# Patient Record
Sex: FEMALE | Race: WHITE | NOT HISPANIC OR LATINO | Employment: FULL TIME | URBAN - METROPOLITAN AREA
[De-identification: names, ages, dates, MRNs, and addresses within clinical notes are randomized per-mention and may not be internally consistent; named-entity substitution may affect disease eponyms.]

---

## 2020-06-29 ENCOUNTER — CLINICAL SUPPORT (OUTPATIENT)
Dept: URGENT CARE | Facility: CLINIC | Age: 40
End: 2020-06-29
Payer: COMMERCIAL

## 2020-06-29 VITALS — TEMPERATURE: 96 F

## 2020-06-29 PROCEDURE — U0003 INFECTIOUS AGENT DETECTION BY NUCLEIC ACID (DNA OR RNA); SEVERE ACUTE RESPIRATORY SYNDROME CORONAVIRUS 2 (SARS-COV-2) (CORONAVIRUS DISEASE [COVID-19]), AMPLIFIED PROBE TECHNIQUE, MAKING USE OF HIGH THROUGHPUT TECHNOLOGIES AS DESCRIBED BY CMS-2020-01-R: HCPCS

## 2020-06-29 PROCEDURE — 99211 OFF/OP EST MAY X REQ PHY/QHP: CPT | Mod: S$GLB,,, | Performed by: NURSE PRACTITIONER

## 2020-06-29 PROCEDURE — 99211 PR OFFICE/OUTPT VISIT, EST, LEVL I: ICD-10-PCS | Mod: S$GLB,,, | Performed by: NURSE PRACTITIONER

## 2020-07-04 LAB — SARS-COV-2 RNA RESP QL NAA+PROBE: NOT DETECTED

## 2021-03-09 ENCOUNTER — PATIENT MESSAGE (OUTPATIENT)
Dept: RESEARCH | Facility: HOSPITAL | Age: 41
End: 2021-03-09

## 2021-03-18 ENCOUNTER — PATIENT MESSAGE (OUTPATIENT)
Dept: RESEARCH | Facility: HOSPITAL | Age: 41
End: 2021-03-18

## 2021-03-26 ENCOUNTER — PATIENT MESSAGE (OUTPATIENT)
Dept: RESEARCH | Facility: HOSPITAL | Age: 41
End: 2021-03-26

## 2022-09-06 ENCOUNTER — OFFICE VISIT (OUTPATIENT)
Dept: INTERNAL MEDICINE | Facility: CLINIC | Age: 42
End: 2022-09-06
Payer: COMMERCIAL

## 2022-09-06 VITALS
SYSTOLIC BLOOD PRESSURE: 113 MMHG | OXYGEN SATURATION: 98 % | HEART RATE: 69 BPM | WEIGHT: 120.5 LBS | DIASTOLIC BLOOD PRESSURE: 69 MMHG | BODY MASS INDEX: 20.68 KG/M2

## 2022-09-06 DIAGNOSIS — G43.009 MIGRAINE WITHOUT AURA AND WITHOUT STATUS MIGRAINOSUS, NOT INTRACTABLE: Primary | ICD-10-CM

## 2022-09-06 DIAGNOSIS — Z13.1 SCREENING FOR DIABETES MELLITUS: ICD-10-CM

## 2022-09-06 DIAGNOSIS — Z13.6 SCREENING FOR CARDIOVASCULAR CONDITION: ICD-10-CM

## 2022-09-06 DIAGNOSIS — N94.3 PREMENSTRUAL SYNDROME: ICD-10-CM

## 2022-09-06 DIAGNOSIS — Q21.12 PFO (PATENT FORAMEN OVALE): ICD-10-CM

## 2022-09-06 DIAGNOSIS — Z12.31 SCREENING MAMMOGRAM FOR BREAST CANCER: ICD-10-CM

## 2022-09-06 DIAGNOSIS — Z00.00 HEALTH MAINTENANCE EXAMINATION: ICD-10-CM

## 2022-09-06 PROCEDURE — 1159F PR MEDICATION LIST DOCUMENTED IN MEDICAL RECORD: ICD-10-PCS | Mod: CPTII,S$GLB,, | Performed by: STUDENT IN AN ORGANIZED HEALTH CARE EDUCATION/TRAINING PROGRAM

## 2022-09-06 PROCEDURE — 99999 PR PBB SHADOW E&M-EST. PATIENT-LVL III: ICD-10-PCS | Mod: PBBFAC,,, | Performed by: STUDENT IN AN ORGANIZED HEALTH CARE EDUCATION/TRAINING PROGRAM

## 2022-09-06 PROCEDURE — 1159F MED LIST DOCD IN RCRD: CPT | Mod: CPTII,S$GLB,, | Performed by: STUDENT IN AN ORGANIZED HEALTH CARE EDUCATION/TRAINING PROGRAM

## 2022-09-06 PROCEDURE — 1160F PR REVIEW ALL MEDS BY PRESCRIBER/CLIN PHARMACIST DOCUMENTED: ICD-10-PCS | Mod: CPTII,S$GLB,, | Performed by: STUDENT IN AN ORGANIZED HEALTH CARE EDUCATION/TRAINING PROGRAM

## 2022-09-06 PROCEDURE — 99386 PR PREVENTIVE VISIT,NEW,40-64: ICD-10-PCS | Mod: S$GLB,,, | Performed by: STUDENT IN AN ORGANIZED HEALTH CARE EDUCATION/TRAINING PROGRAM

## 2022-09-06 PROCEDURE — 3008F PR BODY MASS INDEX (BMI) DOCUMENTED: ICD-10-PCS | Mod: CPTII,S$GLB,, | Performed by: STUDENT IN AN ORGANIZED HEALTH CARE EDUCATION/TRAINING PROGRAM

## 2022-09-06 PROCEDURE — 1160F RVW MEDS BY RX/DR IN RCRD: CPT | Mod: CPTII,S$GLB,, | Performed by: STUDENT IN AN ORGANIZED HEALTH CARE EDUCATION/TRAINING PROGRAM

## 2022-09-06 PROCEDURE — 3008F BODY MASS INDEX DOCD: CPT | Mod: CPTII,S$GLB,, | Performed by: STUDENT IN AN ORGANIZED HEALTH CARE EDUCATION/TRAINING PROGRAM

## 2022-09-06 PROCEDURE — 3078F DIAST BP <80 MM HG: CPT | Mod: CPTII,S$GLB,, | Performed by: STUDENT IN AN ORGANIZED HEALTH CARE EDUCATION/TRAINING PROGRAM

## 2022-09-06 PROCEDURE — 99999 PR PBB SHADOW E&M-EST. PATIENT-LVL III: CPT | Mod: PBBFAC,,, | Performed by: STUDENT IN AN ORGANIZED HEALTH CARE EDUCATION/TRAINING PROGRAM

## 2022-09-06 PROCEDURE — 3074F PR MOST RECENT SYSTOLIC BLOOD PRESSURE < 130 MM HG: ICD-10-PCS | Mod: CPTII,S$GLB,, | Performed by: STUDENT IN AN ORGANIZED HEALTH CARE EDUCATION/TRAINING PROGRAM

## 2022-09-06 PROCEDURE — 3074F SYST BP LT 130 MM HG: CPT | Mod: CPTII,S$GLB,, | Performed by: STUDENT IN AN ORGANIZED HEALTH CARE EDUCATION/TRAINING PROGRAM

## 2022-09-06 PROCEDURE — 3078F PR MOST RECENT DIASTOLIC BLOOD PRESSURE < 80 MM HG: ICD-10-PCS | Mod: CPTII,S$GLB,, | Performed by: STUDENT IN AN ORGANIZED HEALTH CARE EDUCATION/TRAINING PROGRAM

## 2022-09-06 PROCEDURE — 99386 PREV VISIT NEW AGE 40-64: CPT | Mod: S$GLB,,, | Performed by: STUDENT IN AN ORGANIZED HEALTH CARE EDUCATION/TRAINING PROGRAM

## 2022-09-06 RX ORDER — SUMATRIPTAN AND NAPROXEN SODIUM 85; 500 MG/1; MG/1
1 TABLET, FILM COATED ORAL ONCE AS NEEDED
COMMUNITY
End: 2023-05-25 | Stop reason: SDUPTHER

## 2022-09-06 RX ORDER — BACLOFEN 20 MG
1 TABLET ORAL ONCE
COMMUNITY
End: 2024-01-10 | Stop reason: ALTCHOICE

## 2022-09-06 RX ORDER — ESZOPICLONE 2 MG/1
2 TABLET, FILM COATED ORAL NIGHTLY PRN
COMMUNITY
End: 2024-01-10 | Stop reason: ALTCHOICE

## 2022-09-06 NOTE — PATIENT INSTRUCTIONS
Dietary irritants to try avoiding: Coffee, Cola, Beer, Tomatoes, Chocolate, Tea, Citrus fruits    Vitex agnus-castus (Chasteberry) 20 to 40 mg for pre-menstrual symptoms

## 2022-09-06 NOTE — PROGRESS NOTES
Subjective:       Patient ID: Erika Avina is a 42 y.o. female.    Chief Complaint: Health maintenance examination [Z00.00]    Patient is new to me, here to establish care.    Health maintenance -   Denies family history of colorectal cancer.   Has not yet started mammogram screening.   Denies family history of breast cancers.  Denies family history of ovarian cancers.  Last pap performed NOV2020.   Seeing Dr. Andrews for gynecology  Denies history of prior abnormal pap smears.  Significant family history of cardiac disease.  UTD on COVID19 primary and booster, Tdap vaccinations.  Never smoker.  Drinks alcohol 3-4 times weekly, 1-2 drinks per sitting.  Completed HIV and Hep C screening.  Due for lipid screening.  Due for diabetes screening.  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  Endorses exercising routinely  Plays soccer once weekly  Bike rides once weekly  Runs a few times weekly  Endorses overall healthful diet   Plenty of fresh vegetables, whole grains  Chicken, salmon, red meat for proteins    Has regular menstrual cycles once monthly.  Menstruation lasts for 4-5 days.  Dysmenorrhea, insomnia around menstrual cycles.   Takes Lunesta PRN insomnia  Denies menorrhagia or requiring more than 5 pads or tampons in a single day.  Began menarche at age 17.   2 total pregnancies. All born full term, vaginal.  No gestational HTN and DM.  Currently sexually active with male partner, .    with vasectomy.    Migraines -   Currently on Amovig injections for maintenance with good control  Sees neurologist routinely, Dr. Carr   Taking magnesium 500 mg  Has sumatriptan-naproxen PRN abortive treatment    Takes ASA due to PFO for flights and prolonged immobilization    Review of Systems   Constitutional:  Negative for appetite change, chills, fatigue, fever and unexpected weight change.   Respiratory:  Negative for cough and shortness of breath.    Cardiovascular:  Negative for chest pain,  palpitations and leg swelling.   Gastrointestinal:  Negative for abdominal pain, constipation, diarrhea, nausea and vomiting.   Skin:  Negative for rash.   Neurological:  Positive for headaches. Negative for dizziness, syncope and weakness.       Current Outpatient Medications   Medication Instructions    aspirin (ECOTRIN) 81 mg, Oral, Daily    erenumab-aooe (AIMOVIG) 140 mg, Subcutaneous, Every 28 days    eszopiclone (LUNESTA) 2 mg, Oral, Nightly PRN    magnesium oxide 500 mg Tab 1 capsule, Oral, Once    SUMAtriptan-naproxen (TREXIMET)  mg Tab 1 tablet, Oral, Once as needed     Objective:      Vitals:    09/06/22 1504   BP: 113/69   Pulse: 69   SpO2: 98%   Weight: 54.6 kg (120 lb 7.7 oz)   PainSc: 0-No pain     Body mass index is 20.68 kg/m².    Physical Exam  Vitals reviewed.   Constitutional:       General: She is not in acute distress.     Appearance: Normal appearance. She is not ill-appearing or diaphoretic.   HENT:      Head: Normocephalic and atraumatic.      Right Ear: Tympanic membrane, ear canal and external ear normal. There is no impacted cerumen.      Left Ear: Tympanic membrane, ear canal and external ear normal. There is no impacted cerumen.      Nose: Nose normal. No rhinorrhea.      Mouth/Throat:      Mouth: Mucous membranes are moist.      Pharynx: Oropharynx is clear. No oropharyngeal exudate or posterior oropharyngeal erythema.   Eyes:      General: No scleral icterus.        Right eye: No discharge.         Left eye: No discharge.      Conjunctiva/sclera: Conjunctivae normal.   Neck:      Thyroid: No thyromegaly or thyroid tenderness.      Trachea: Trachea normal.   Cardiovascular:      Rate and Rhythm: Normal rate and regular rhythm.      Heart sounds: Normal heart sounds. No murmur heard.    No friction rub. No gallop.   Pulmonary:      Effort: Pulmonary effort is normal. No respiratory distress.      Breath sounds: Normal breath sounds. No stridor. No wheezing, rhonchi or rales.    Abdominal:      General: Bowel sounds are normal. There is no distension.      Palpations: Abdomen is soft.      Tenderness: There is no abdominal tenderness. There is no guarding or rebound.   Musculoskeletal:         General: No swelling or deformity.      Cervical back: Neck supple.   Lymphadenopathy:      Head:      Right side of head: No submandibular or posterior auricular adenopathy.      Left side of head: No submandibular or posterior auricular adenopathy.      Cervical: No cervical adenopathy.      Right cervical: No superficial, deep or posterior cervical adenopathy.     Left cervical: No superficial, deep or posterior cervical adenopathy.      Upper Body:      Right upper body: No supraclavicular adenopathy.      Left upper body: No supraclavicular adenopathy.   Skin:     General: Skin is warm and dry.   Neurological:      General: No focal deficit present.      Mental Status: She is alert. Mental status is at baseline.      Gait: Gait normal.   Psychiatric:         Mood and Affect: Mood normal.         Behavior: Behavior normal.       Assessment:       1. Health maintenance examination    2. Migraine without aura and without status migrainosus, not intractable    3. Premenstrual syndrome    4. PFO (patent foramen ovale)    5. Screening for cardiovascular condition    6. Screening for diabetes mellitus    7. Screening mammogram for breast cancer        Plan:       Migraine without aura and without status migrainosus, not intractable  Continue medication, evaluation, and management per neurology.  -     Magnesium; Future  -     CBC Auto Differential; Future  -     Comprehensive Metabolic Panel; Future  -     TSH; Future    Premenstrual syndrome  Recommend trial of Agnus vitex     PFO (patent foramen ovale)  Continue ASA as directed     Health maintenance examination  RTC in 1 year for annual appointment, sooner PRN.    Screening for cardiovascular condition  -     Lipid Panel; Future    Screening for  diabetes mellitus  -     Hemoglobin A1C; Future    Screening mammogram for breast cancer  -     Mammo Digital Screening Bilat w/ Dashawn; Future      Blanka Moore MD  9/6/2022

## 2022-10-10 ENCOUNTER — PATIENT MESSAGE (OUTPATIENT)
Dept: INTERNAL MEDICINE | Facility: CLINIC | Age: 42
End: 2022-10-10
Payer: COMMERCIAL

## 2022-10-25 ENCOUNTER — PATIENT OUTREACH (OUTPATIENT)
Dept: ADMINISTRATIVE | Facility: HOSPITAL | Age: 42
End: 2022-10-25
Payer: COMMERCIAL

## 2023-01-10 ENCOUNTER — PATIENT MESSAGE (OUTPATIENT)
Dept: INTERNAL MEDICINE | Facility: CLINIC | Age: 43
End: 2023-01-10
Payer: COMMERCIAL

## 2023-01-10 DIAGNOSIS — G43.009 MIGRAINE WITHOUT AURA AND WITHOUT STATUS MIGRAINOSUS, NOT INTRACTABLE: Primary | ICD-10-CM

## 2023-01-11 NOTE — TELEPHONE ENCOUNTER
Pt was scheduled  
Referral to neurology placed. Please assist with rescheduling labs, thank you!  
Principal Discharge DX:	URI (upper respiratory infection)

## 2023-01-17 ENCOUNTER — PATIENT MESSAGE (OUTPATIENT)
Dept: INTERNAL MEDICINE | Facility: CLINIC | Age: 43
End: 2023-01-17
Payer: COMMERCIAL

## 2023-01-18 ENCOUNTER — PATIENT MESSAGE (OUTPATIENT)
Dept: ADMINISTRATIVE | Facility: HOSPITAL | Age: 43
End: 2023-01-18
Payer: COMMERCIAL

## 2023-03-17 ENCOUNTER — OFFICE VISIT (OUTPATIENT)
Dept: NEUROLOGY | Facility: CLINIC | Age: 43
End: 2023-03-17
Payer: COMMERCIAL

## 2023-03-17 VITALS
HEART RATE: 70 BPM | BODY MASS INDEX: 19.97 KG/M2 | WEIGHT: 117 LBS | SYSTOLIC BLOOD PRESSURE: 100 MMHG | HEIGHT: 64 IN | DIASTOLIC BLOOD PRESSURE: 63 MMHG

## 2023-03-17 DIAGNOSIS — G43.009 MIGRAINE WITHOUT AURA AND WITHOUT STATUS MIGRAINOSUS, NOT INTRACTABLE: ICD-10-CM

## 2023-03-17 PROCEDURE — 99999 PR PBB SHADOW E&M-EST. PATIENT-LVL III: ICD-10-PCS | Mod: PBBFAC,,, | Performed by: PSYCHIATRY & NEUROLOGY

## 2023-03-17 PROCEDURE — 3078F PR MOST RECENT DIASTOLIC BLOOD PRESSURE < 80 MM HG: ICD-10-PCS | Mod: CPTII,S$GLB,, | Performed by: PSYCHIATRY & NEUROLOGY

## 2023-03-17 PROCEDURE — 1160F RVW MEDS BY RX/DR IN RCRD: CPT | Mod: CPTII,S$GLB,, | Performed by: PSYCHIATRY & NEUROLOGY

## 2023-03-17 PROCEDURE — 1159F MED LIST DOCD IN RCRD: CPT | Mod: CPTII,S$GLB,, | Performed by: PSYCHIATRY & NEUROLOGY

## 2023-03-17 PROCEDURE — 3074F PR MOST RECENT SYSTOLIC BLOOD PRESSURE < 130 MM HG: ICD-10-PCS | Mod: CPTII,S$GLB,, | Performed by: PSYCHIATRY & NEUROLOGY

## 2023-03-17 PROCEDURE — 3008F BODY MASS INDEX DOCD: CPT | Mod: CPTII,S$GLB,, | Performed by: PSYCHIATRY & NEUROLOGY

## 2023-03-17 PROCEDURE — 99204 OFFICE O/P NEW MOD 45 MIN: CPT | Mod: S$GLB,,, | Performed by: PSYCHIATRY & NEUROLOGY

## 2023-03-17 PROCEDURE — 99204 PR OFFICE/OUTPT VISIT, NEW, LEVL IV, 45-59 MIN: ICD-10-PCS | Mod: S$GLB,,, | Performed by: PSYCHIATRY & NEUROLOGY

## 2023-03-17 PROCEDURE — 3078F DIAST BP <80 MM HG: CPT | Mod: CPTII,S$GLB,, | Performed by: PSYCHIATRY & NEUROLOGY

## 2023-03-17 PROCEDURE — 99999 PR PBB SHADOW E&M-EST. PATIENT-LVL III: CPT | Mod: PBBFAC,,, | Performed by: PSYCHIATRY & NEUROLOGY

## 2023-03-17 PROCEDURE — 1159F PR MEDICATION LIST DOCUMENTED IN MEDICAL RECORD: ICD-10-PCS | Mod: CPTII,S$GLB,, | Performed by: PSYCHIATRY & NEUROLOGY

## 2023-03-17 PROCEDURE — 3008F PR BODY MASS INDEX (BMI) DOCUMENTED: ICD-10-PCS | Mod: CPTII,S$GLB,, | Performed by: PSYCHIATRY & NEUROLOGY

## 2023-03-17 PROCEDURE — 1160F PR REVIEW ALL MEDS BY PRESCRIBER/CLIN PHARMACIST DOCUMENTED: ICD-10-PCS | Mod: CPTII,S$GLB,, | Performed by: PSYCHIATRY & NEUROLOGY

## 2023-03-17 PROCEDURE — 3074F SYST BP LT 130 MM HG: CPT | Mod: CPTII,S$GLB,, | Performed by: PSYCHIATRY & NEUROLOGY

## 2023-03-17 RX ORDER — ASCORBIC ACID 125 MG
250 TABLET,CHEWABLE ORAL
COMMUNITY
End: 2024-01-10 | Stop reason: ALTCHOICE

## 2023-03-17 NOTE — PROGRESS NOTES
[facility] - HEADACHE MEDICINE    CHIEF COMPLAINT?   Ms. Erika Avina chief concern is headache.     Name of the referring physician Blanka Moore MD     Name of primary care provider Blanka Moore MD      Subjective:    HPI:  Ms. Erika Avina presents today to discuss their ongoing headaches.   The patient shares that she seeks to establish care for headache after having outside provider for many years.  Overall, she feels as if her current treatment plan has migraines well managed.  She describes a history of grossly uncontrolled headaches that had significant impact on life and function.  The addition of Aimovig several years ago, 2017, essentially changed my life.   She has ineffective acute regimen with sumatriptan/naproxen and denies needing refills at this time.    When prompted regarding Aimovig and side effects, the patient admits to a long history of constipation since starting the medication.  She takes magnesium daily in an effort to compensate for the medication.  She also notes that there is wear off effect every month; majority of headaches happen within the last 7 days of injection cycle.    Headache details:  Age of onset: 3rd grade  When did they become more of a problem: 3rd grade  # of Total headache days per month: 7; 12-15 before aimovig  # of Migraine or severe days per month: 0; 8-10 before aimovig  This rate has been present >3 months: yes  Intensity of average and most severe headaches: 2/10, 10/10  Duration of headache pain: >4 hrs untreated  Quality of pain: Pulsating/Throbbing, sharp  Laterality: bilateral, unilateral not side locked sometimes   Location: temporal, frontal, whole head   Photophobia and phonophobia: yes  Nausea and vomiting: yes  Causes avoidance of physical activity: yes  Aura: no  Autonomic symptoms: no  Family Hx of migraines: grandmother  Birth Control/Hormones: none  Have qualities and features been stable for >2 years:  yes    Preventive Medications failed: topiramate (>3 months, ineffective), amitriptyline (>3 months, ineffective), atenolol (>3 months, ineffective), nebivolol (>3 months, ineffective)    Acute medications failed: treximet (highly effective), eletriptan (ineffective), rizatriptan (ineffective)    OTC Medications: none  Is medication overuse contributing to the patient's current migraine burden: no    Hx of (Bolded items are positive): depression, anxiety, fibromyalgia, autoimmune disorder, head trauma, neurological infection, glaucoma, asthma, nephrolithiasis, MI, Stroke, Raynaud's phen., PFO    Current HA Regimen:  Aimovig 140mg monthly  Treximet prn     LATEST IMAGING:  Reports normal MRI's in the past    PAST MEDICAL HISTORY:  Past Medical History:   Diagnosis Date    Migraine headache        PAST SURGICAL HISTORY:  Past Surgical History:   Procedure Laterality Date    WISDOM TOOTH EXTRACTION         CURRENT MEDS:  Current Outpatient Medications   Medication Sig Dispense Refill    aspirin (ECOTRIN) 81 MG EC tablet Take 1 tablet (81 mg total) by mouth once daily.  0    erenumab-aooe (AIMOVIG) 70 mg/mL autoinjector Inject 140 mg into the skin every 28 days.      eszopiclone (LUNESTA) 2 MG Tab Take 2 mg by mouth nightly as needed. Patient needs refill.      magnesium citrate 125 mg Cap Take 250 mg by mouth.      SUMAtriptan-naproxen (TREXIMET)  mg Tab Take 1 tablet by mouth once as needed.      magnesium oxide 500 mg Tab Take 1 capsule by mouth once.       No current facility-administered medications for this visit.       ALLERGIES:  Review of patient's allergies indicates:  No Known Allergies    FAMILY HISTORY:  Family History   Problem Relation Age of Onset    No Known Problems Mother     Atrial fibrillation Father     No Known Problems Brother     Aortic aneurysm Maternal Grandmother         AAA    Coronary artery disease Maternal Grandmother         CABG    Pulmonary fibrosis Maternal Grandmother          "idiopathic    Heart attack Maternal Grandfather     Skin cancer Maternal Grandfather     Prostate cancer Paternal Grandfather         metastasized intestinal    Colon cancer Neg Hx     Breast cancer Neg Hx     Ovarian cancer Neg Hx     Stroke Neg Hx     Diabetes Neg Hx        SOCIAL HISTORY:  Social History     Tobacco Use    Smoking status: Never    Tobacco comments:      smokes outside   Substance Use Topics    Alcohol use: Yes     Alcohol/week: 5.0 standard drinks     Types: 5 Glasses of wine per week     Comment: social    Drug use: Not Currently       Review of Systems:  Gen: no fever, no chills, no generalized feeling of weakness   HEENT: no double vision, no blurred vision, no eye pain, no eye exudates.    Heart: no chest pain, no SOB    Lungs: no SOB, no cough    MSK: no weakness of legs, intact ROM    ABD: no abd pain, no N/V/D/C, no difficulty with defecation .    Extremities: No leg pain, no edema.    Objective:  PHYSICAL EXAMINATION??   Erika Avina is a 42 y.o. female patient who has the following vital signs with   Vitals:    03/17/23 1339   BP: 100/63   BP Location: Left arm   Patient Position: Sitting   Pulse: 70   Weight: 53.1 kg (117 lb)   Height: 5' 4" (1.626 m)   , body surface area is 1.55 meters squared.     General: female in NAD, alert and awake, Aox3, well groomed. ?    ? ?    HEENT: Head is NC/AT EOMI, pupil size: 4 mm B/L, no nystagmus noted; hearing grossly intact b/l. Mucous membrane moist, uvula midline, no pharyngeal erythema,exudates or discharges.     Neck:  Active range of motion.  no nuchal rigidity.      Cardiovascular: well perfused, no cyanosis        Respiratory: Symmetric chest rise noted       Musculoskeletal: Muscle tone noted to be adequate for patient age, muscle mass is WNL. No spontaneous movements or fasciculations noted during this examination.       Extremities: No pedal edema or calf tenderness.      Neurological Examination.    Mental status: AA&O " x3; Affect/mood is euthymic/congruent; no aphasia noted during examination. Patient answers simple questions appropriately & follows simple commands; no dysarthria or expressive aphasia; no samira-neglect or extinction. Vocabulary/word finding: excellent.       Cranial Nerves: II-XII grossly intact bilaterally.     Muscle Function: Tone WNL and Muscle bulk WNL.  Full and symmetric use of bilateral upper and lower extremities against gravity     Gait: adequate casual gait with stride length and arm swing WNL.  Stable without assistance    REVIEW   We reviewed their current medical history, medications, allergies, past medical history, surgical history, social history, family history, and review of systems, and updated all the information.     ASSESSMENT AND PLAN   Erika? is a very pleasant, 42 y.o. with complicated diagnosis of     1. Migraine without aura and without status migrainosus, not intractable  Ambulatory referral/consult to Neurology        Erika has headaches that meet International Headache Society Criteria for episodic migraine without aura.  Medication overuse is not likely contributing to this patient's headaches.     Patient feels not only severe disabling pain, but is also unable to function due to headaches when they are present.     My approach to every patient is multimodal.   I focused our discussion on addressing modifiable risk factors and trying to decrease them. After discussion with the patient, I recommend the followin. Preventive medications; these medications have to be taken every single day to decrease headache frequency and severity; it takes at least minimum of few weeks to see any results; and have to be taken for at least 6-12 months. The medication should be started at a small dose and increased if there are no significant side effects. Patient compliance is key for effectiveness of the preventive medications. (we discussed the options of beta-blockers, calcium channel  blockers, SSRIs, SNRIs, neuron modulating agents)   Failed:    topiramate (>3 months, ineffective), amitriptyline (>3 months, ineffective), atenolol (>3 months, ineffective), nebivolol (>3 months, ineffective)    CONTINUE: AIMOVIG 140 mg every 28 days  Due to reported constipation, we discussed possibly moving away from this particular injectable when she has used her current supply (3 months).  We will communicate about this via chart message between visits.     2. Acute (abortive) medications- these medications are to be taken only at the onset of severe headache, and please limit the total of any combination of these medications to less than 10 days per month on average because they can cause medication overuse headaches.   Failed:   eletriptan (ineffective), rizatriptan (ineffective)    CONTINUE: TREXIMET prn    Patient was encouraged to continue use less than 10 days per month to avoid medication overuse headaches.    3. Natural approaches to increasing brain energy and serotonin:    SAMe 200 mg a day    Vitamin B2 400 mg daily    Vitamin B12 1000 mcg daily    Getting early morning light to increase natural serotonin, melatonin. Could also consider light therapy box, 10,000 LUX.     4. Headache prevention and acute treatment over-the counter supplements    Boswellia 800 mg 2-3 times per day, example brand Guzman's, natural anti inflammatory herb    Sleep? modulation- melatonin at night 5 to 10 mg 30 minutes prior to sleep    Feverfew Tea 1-3 cups per day. Can get from [employer] or [employer]- A Natural anti inflammatory approach that does not cause further sensitization of the system.    Magnesium Citrate 250 mg daily, slowly increase up to 500-1000 mg daily. Side effect may include diarrhea, so we recommend stopping at whatever dose is comfortable for your body without causing this side effect. This supplement can be very helpful for preventing headache, preventing migraine aura, calming nerves,  muscles and even mood. Recommend starting slowly, as it can also lead to increased bowel movements or diarrhea.?     5. DIET: I recommend a diet that heavily favors fruits and vegetables while reducing carbs. More information is available upon request.     6. EXERCISE: Gentle movement exercises, concentrate on combination of muscle building and aerobic. I also recommend adding gentle Yoga therapy. The goal is 150 minutes per week of moderate to rigorous exercise, but you should start at your own pace and progress slowly and safely as discussed today.    7. ANXIETY/STRESS- Control stressors with yoga, meditation, counseling, or other methods of mindfulness.     8. SLEEP- aim for 7-8 hrs of restful sleep per night.     9. FUN- Increase fun time spend with friends and family     Benefits, side effects, and alternatives were discussed extensively with the patient.?     Erika verbally endorsed understanding of all the recommendations.?     she is going to follow up with our clinic in 1 year or sooner as needed?     I spent a total of 45 minutes on the day of the visit. This includes face to face time and non-face to face time preparing to see the patient (eg, review of tests), obtaining and/or reviewing separately obtained history, documenting clinical information in the electronic or other health record, independently interpreting results and communicating results to the patient/family/caregiver, or care coordinator.    A dictation device was used to produce this documentation which can sometimes result in grammatical errors or the replacement of similar sounding words.    Rad Mcguire, DO  Headache Medicine

## 2023-05-02 ENCOUNTER — PATIENT MESSAGE (OUTPATIENT)
Dept: NEUROLOGY | Facility: CLINIC | Age: 43
End: 2023-05-02
Payer: COMMERCIAL

## 2023-05-02 DIAGNOSIS — G47.00 INSOMNIA, UNSPECIFIED TYPE: ICD-10-CM

## 2023-05-02 DIAGNOSIS — G43.009 MIGRAINE WITHOUT AURA AND WITHOUT STATUS MIGRAINOSUS, NOT INTRACTABLE: Primary | ICD-10-CM

## 2023-05-03 RX ORDER — TRAZODONE HYDROCHLORIDE 50 MG/1
50 TABLET ORAL NIGHTLY
Qty: 30 TABLET | Refills: 2 | Status: SHIPPED | OUTPATIENT
Start: 2023-05-03 | End: 2024-05-02

## 2023-05-25 ENCOUNTER — PATIENT MESSAGE (OUTPATIENT)
Dept: NEUROLOGY | Facility: CLINIC | Age: 43
End: 2023-05-25
Payer: COMMERCIAL

## 2023-05-25 DIAGNOSIS — G43.009 MIGRAINE WITHOUT AURA AND WITHOUT STATUS MIGRAINOSUS, NOT INTRACTABLE: Primary | ICD-10-CM

## 2023-05-25 RX ORDER — SUMATRIPTAN AND NAPROXEN SODIUM 85; 500 MG/1; MG/1
TABLET, FILM COATED ORAL
Qty: 9 TABLET | Refills: 11 | Status: SHIPPED | OUTPATIENT
Start: 2023-05-25

## 2023-05-25 NOTE — TELEPHONE ENCOUNTER
Continued constipation with Aimovig, we will transition to Emgality.    Refill provided for treximet.

## 2023-05-26 ENCOUNTER — TELEPHONE (OUTPATIENT)
Dept: PHARMACY | Facility: CLINIC | Age: 43
End: 2023-05-26
Payer: COMMERCIAL

## 2023-05-31 ENCOUNTER — PATIENT MESSAGE (OUTPATIENT)
Dept: NEUROLOGY | Facility: CLINIC | Age: 43
End: 2023-05-31
Payer: COMMERCIAL

## 2023-08-15 ENCOUNTER — HOSPITAL ENCOUNTER (OUTPATIENT)
Dept: RADIOLOGY | Facility: OTHER | Age: 43
Discharge: HOME | End: 2023-08-15
Attending: STUDENT IN AN ORGANIZED HEALTH CARE EDUCATION/TRAINING PROGRAM
Payer: COMMERCIAL

## 2023-08-15 DIAGNOSIS — Z12.31 SCREENING MAMMOGRAM FOR BREAST CANCER: ICD-10-CM

## 2023-08-15 PROCEDURE — 77063 BREAST TOMOSYNTHESIS BI: CPT | Mod: 26,,, | Performed by: RADIOLOGY

## 2023-08-15 PROCEDURE — 77067 SCR MAMMO BI INCL CAD: CPT | Mod: TC

## 2023-08-15 PROCEDURE — 77063 MAMMO DIGITAL SCREENING BILAT WITH TOMO: ICD-10-PCS | Mod: 26,,, | Performed by: RADIOLOGY

## 2023-08-15 PROCEDURE — 77067 MAMMO DIGITAL SCREENING BILAT WITH TOMO: ICD-10-PCS | Mod: 26,,, | Performed by: RADIOLOGY

## 2023-08-15 PROCEDURE — 77067 SCR MAMMO BI INCL CAD: CPT | Mod: 26,,, | Performed by: RADIOLOGY

## 2023-08-29 ENCOUNTER — PATIENT MESSAGE (OUTPATIENT)
Dept: NEUROLOGY | Facility: CLINIC | Age: 43
End: 2023-08-29
Payer: COMMERCIAL

## 2023-08-29 DIAGNOSIS — G43.009 MIGRAINE WITHOUT AURA AND WITHOUT STATUS MIGRAINOSUS, NOT INTRACTABLE: ICD-10-CM

## 2023-08-29 RX ORDER — GALCANEZUMAB 120 MG/ML
120 INJECTION, SOLUTION SUBCUTANEOUS
Qty: 3 EACH | Refills: 3 | Status: SHIPPED | OUTPATIENT
Start: 2023-08-29 | End: 2023-08-29 | Stop reason: SDUPTHER

## 2023-08-29 RX ORDER — GALCANEZUMAB 120 MG/ML
120 INJECTION, SOLUTION SUBCUTANEOUS
Qty: 3 EACH | Refills: 3 | Status: SHIPPED | OUTPATIENT
Start: 2023-08-29 | End: 2023-12-05

## 2023-08-30 ENCOUNTER — OFFICE VISIT (OUTPATIENT)
Dept: NEUROLOGY | Facility: CLINIC | Age: 43
End: 2023-08-30
Payer: COMMERCIAL

## 2023-08-30 DIAGNOSIS — G43.009 MIGRAINE WITHOUT AURA AND WITHOUT STATUS MIGRAINOSUS, NOT INTRACTABLE: Primary | ICD-10-CM

## 2023-08-30 PROCEDURE — 1159F MED LIST DOCD IN RCRD: CPT | Mod: CPTII,95,, | Performed by: PSYCHIATRY & NEUROLOGY

## 2023-08-30 PROCEDURE — 99214 OFFICE O/P EST MOD 30 MIN: CPT | Mod: 95,,, | Performed by: PSYCHIATRY & NEUROLOGY

## 2023-08-30 PROCEDURE — 1160F PR REVIEW ALL MEDS BY PRESCRIBER/CLIN PHARMACIST DOCUMENTED: ICD-10-PCS | Mod: CPTII,95,, | Performed by: PSYCHIATRY & NEUROLOGY

## 2023-08-30 PROCEDURE — 1160F RVW MEDS BY RX/DR IN RCRD: CPT | Mod: CPTII,95,, | Performed by: PSYCHIATRY & NEUROLOGY

## 2023-08-30 PROCEDURE — 99214 PR OFFICE/OUTPT VISIT, EST, LEVL IV, 30-39 MIN: ICD-10-PCS | Mod: 95,,, | Performed by: PSYCHIATRY & NEUROLOGY

## 2023-08-30 PROCEDURE — 1159F PR MEDICATION LIST DOCUMENTED IN MEDICAL RECORD: ICD-10-PCS | Mod: CPTII,95,, | Performed by: PSYCHIATRY & NEUROLOGY

## 2023-08-30 RX ORDER — UBROGEPANT 100 MG/1
100 TABLET ORAL
Qty: 10 TABLET | Refills: 11 | Status: SHIPPED | OUTPATIENT
Start: 2023-08-30 | End: 2023-09-13 | Stop reason: SDUPTHER

## 2023-08-30 NOTE — PROGRESS NOTES
[facility] - HEADACHE MEDICINE    CHIEF COMPLAINT?   Ms. Erika Avina chief concern is follow-up for migraine    Name of the referring physician No ref. provider found     Name of primary care provider Blanka Moore MD    TELEMEDICINE ENCOUNTER  Patient verbally confirmed being in the State of [place] at the time of the encounter  Provider was located in the Neurology office at [facility].       Subjective:    08/30/23:  Patient shares that they been doing very well since previous encounter.  Currently on Emgality monthly injections which has provided fantastic monthly coverage for migraine episodes.  She does note that there is a more pronounced wearing off effect with Emgality over her previous Aimovig.  Emgality however does not make her constipated like previous regimens.  Very pleased.  Not as pleased with acute migraine medication, sumatriptan-naproxen.  Ineffective.  Was given Ubrelvy samples by another provider which she found highly effective for acute migraine pain especially during the last week of injection cycle where more headaches were experienced.  Would like to proceed with Ubrelvy which I support.    Total headache days in the last month:  5   Total severe headache days in the last month:  3   All headache days where during last week of injection cycle    ===================================================================  03/17/23 HPI:  Ms. Erika Avina presents today to discuss their ongoing headaches.   The patient shares that she seeks to establish care for headache after having outside provider for many years.  Overall, she feels as if her current treatment plan has migraines well managed.  She describes a history of grossly uncontrolled headaches that had significant impact on life and function.  The addition of Aimovig several years ago, 2017, essentially changed my life.   She has ineffective acute regimen with sumatriptan/naproxen and denies needing  refills at this time.    When prompted regarding Aimovig and side effects, the patient admits to a long history of constipation since starting the medication.  She takes magnesium daily in an effort to compensate for the medication.  She also notes that there is wear off effect every month; majority of headaches happen within the last 7 days of injection cycle.    Headache details:  Age of onset: 5rd grade  When did they become more of a problem: 3rd grade  # of Total headache days per month: 7; 12-15 before aimovig  # of Migraine or severe days per month: 0; 8-10 before aimovig  This rate has been present >3 months: yes  Intensity of average and most severe headaches: 2/10, 10/10  Duration of headache pain: >4 hrs untreated  Quality of pain: Pulsating/Throbbing, sharp  Laterality: bilateral, unilateral not side locked sometimes   Location: temporal, frontal, whole head   Photophobia and phonophobia: yes  Nausea and vomiting: yes  Causes avoidance of physical activity: yes  Aura: no  Autonomic symptoms: no  Family Hx of migraines: grandmother  Birth Control/Hormones: none  Have qualities and features been stable for >2 years: yes    Preventive Medications failed: topiramate (>3 months, ineffective), amitriptyline (>3 months, ineffective), atenolol (>3 months, ineffective), nebivolol (>3 months, ineffective)    Acute medications failed: treximet (highly effective), eletriptan (ineffective), rizatriptan (ineffective)    OTC Medications: none  Is medication overuse contributing to the patient's current migraine burden: no    Hx of (Bolded items are positive): depression, anxiety, fibromyalgia, autoimmune disorder, head trauma, neurological infection, glaucoma, asthma, nephrolithiasis, MI, Stroke, Raynaud's phen., PFO    Current HA Regimen:  Aimovig 140mg monthly  Treximet prn     ===================================================================  LATEST IMAGING:  Previously reported having a normal MRI in the  past.    CURRENT MEDS:  Current Outpatient Medications   Medication Sig Dispense Refill    aspirin (ECOTRIN) 81 MG EC tablet Take 1 tablet (81 mg total) by mouth once daily.  0    eszopiclone (LUNESTA) 2 MG Tab Take 2 mg by mouth nightly as needed. Patient needs refill.      galcanezumab-gnlm (EMGALITY PEN) 120 mg/mL PnIj Inject 120 mg as directed every 28 days. 3 each 3    magnesium citrate 125 mg Cap Take 250 mg by mouth.      magnesium oxide 500 mg Tab Take 1 capsule by mouth once.      SUMAtriptan-naproxen (TREXIMET)  mg Tab Take one pill by mouth at the first sign of migraine. Repeat in 2 hrs as needed. Do not take more than 2 tabs in 24 hrs. 9 tablet 11    traZODone (DESYREL) 50 MG tablet Take 1 tablet (50 mg total) by mouth every evening. 30 tablet 2     No current facility-administered medications for this visit.       ALLERGIES:  Review of patient's allergies indicates:  No Known Allergies    Objective:  PHYSICAL EXAMINATION??   No vitals due to nature of telemedicine encounter.     GENERAL: Pleasant, well-appearing in no acute distress.     HEENT: Head normorcephalic, atraumatic.     PULMONARY: Breathing comfortably on room air.    NEURO:   Mental status: Awake, alert, and oriented to person, place, time, and situation. Speech clear, fluent.     Cranial nerves:     III/IV/VI: EOMI. No nystagmus.     VII: Facial expressions full and symmetric.     VIII: Hearing intact to voice.     Motor exam:  Full and symmetric use of bilateral upper extremities on camera    REVIEW   We reviewed their current medical history, medications, allergies, past medical history, surgical history, social history, family history, and review of systems, and updated all the information.     ASSESSMENT AND PLAN   Erika? is a very pleasant, 43 y.o. with complicated diagnosis of     1. Migraine without aura and without status migrainosus, not intractable            Erika has headaches that meet International Headache Society  Criteria for episodic migraine without aura.  Medication overuse is not likely contributing to this patient's headaches.     Patient feels not only severe disabling pain, but is also unable to function due to headaches when they are present.     My approach to every patient is multimodal.   I focused our discussion on addressing modifiable risk factors and trying to decrease them. After discussion with the patient, I recommend the followin. Preventive medications:  Failed:    topiramate (>3 months, ineffective), amitriptyline (>3 months, ineffective), atenolol (>3 months, ineffective), nebivolol (>3 months, ineffective), aimovig (intolerable constipation)    CONTINUE: Emgality 120 mg q28 days  Notably, this medication has provided greater than 50% reduction in headache frequency and intensity over baseline without medication.  It is also provided suitable relief without constipation     2. Acute (abortive) medications- these medications are to be taken only at the onset of severe headache, and please limit the total of any combination of these medications to less than 10 days per month on average because they can cause medication overuse headaches.   Failed:   eletriptan (ineffective), rizatriptan (ineffective), sumatriptan (ineffective)    CONTINUE: Ubrelvy 100 prn; prescription provided  Ubrelvy (urogepant) is a newer medication that belongs to the class of anti-CGRP drugs recommended for acute treatment or migraine.    Instructions: 100 mg (1 tablet) at the first sign of headache; if headache is still present in 2 hours, you may take 1 additional pill.  Do not exceed 200 mg (2 tablets) in 24 hours.  Even though very rare, side effects are possible and include nausea (3%), somnolence (2%), dry mouth (1%).   Dose reduction is indicated for severe hepatic impairment. This medication is safe for heart and blood vessels.    Insurance providers usually approve 10-16 tablets per month.     3. Natural approaches  to increasing brain energy and serotonin:    SAMe 200 mg a day    Vitamin B2 400 mg daily    Vitamin B12 1000 mcg daily    Getting early morning light to increase natural serotonin, melatonin. Could also consider light therapy box, 10,000 LUX.     4. Headache prevention and acute treatment over-the counter supplements    Boswellia 800 mg 2-3 times per day, example brand Megan's, natural anti inflammatory herb    Sleep? modulation- melatonin at night 5 to 10 mg 30 minutes prior to sleep    Feverfew Tea 1-3 cups per day. Can get from [employer] or [employer]- A Natural anti inflammatory approach that does not cause further sensitization of the system.    Magnesium Citrate 250 mg daily, slowly increase up to 500-1000 mg daily. Side effect may include diarrhea, so we recommend stopping at whatever dose is comfortable for your body without causing this side effect. This supplement can be very helpful for preventing headache, preventing migraine aura, calming nerves, muscles and even mood. Recommend starting slowly, as it can also lead to increased bowel movements or diarrhea.?     5. DIET: I recommend a diet that heavily favors fruits and vegetables while reducing carbs. More information is available upon request.     6. EXERCISE: Gentle movement exercises, concentrate on combination of muscle building and aerobic. I also recommend adding gentle Yoga therapy. The goal is 150 minutes per week of moderate to rigorous exercise, but you should start at your own pace and progress slowly and safely as discussed today.    7. ANXIETY/STRESS- Control stressors with yoga, meditation, counseling, or other methods of mindfulness.     8. SLEEP- aim for 7-8 hrs of restful sleep per night.     Benefits, side effects, and alternatives were discussed extensively with the patient.?     Erika verbally endorsed understanding of all the recommendations.?     she is going to follow up with our clinic in the future as  needed    Distant Site Telemedicine Encounter   I conducted this encounter from [facility] facilities via secure, live, face-to-face video conference with Erika who was at their home. Prior to the interview, the risks and benefits of telemedicine were discussed with the patient and verbal consent was obtained.? All participants were identified and introduced.Patient was notified that they may decline to receive medical services by telemedicine and may withdraw from such care at any time. Patient's location within the [facility] was confirmed verbally.    I spent a total of 30 minutes preparing for the visit, documenting the visit, and direct face to face (telehealth) time.?     A dictation device was used to produce this documentation which can sometimes result in grammatical errors or the replacement of similar sounding words.    Rad Mcguire, DO  Headache Medicine

## 2023-08-31 ENCOUNTER — PATIENT MESSAGE (OUTPATIENT)
Dept: NEUROLOGY | Facility: CLINIC | Age: 43
End: 2023-08-31
Payer: COMMERCIAL

## 2023-09-12 ENCOUNTER — TELEPHONE (OUTPATIENT)
Dept: NEUROLOGY | Facility: CLINIC | Age: 43
End: 2023-09-12
Payer: COMMERCIAL

## 2023-09-12 NOTE — TELEPHONE ENCOUNTER
----- Message from Paul Craven MA sent at 9/11/2023  4:17 PM CDT -----    ----- Message -----  From: Leora Goddard  Sent: 9/11/2023   3:47 PM CDT  To: Shayla Leroy Staff    Type:  Pharmacy Calling to Clarify an RX      [facility]  Prescription Name:ubrogepant (UBRELVY) 100 mg tablet  What do they need to clarify?:needs new prescription sent over  Best Call Back Number:   Additional Information:

## 2023-09-13 ENCOUNTER — TELEPHONE (OUTPATIENT)
Dept: NEUROLOGY | Facility: CLINIC | Age: 43
End: 2023-09-13
Payer: COMMERCIAL

## 2023-09-13 DIAGNOSIS — G43.009 MIGRAINE WITHOUT AURA AND WITHOUT STATUS MIGRAINOSUS, NOT INTRACTABLE: ICD-10-CM

## 2023-09-13 RX ORDER — UBROGEPANT 100 MG/1
100 TABLET ORAL
Qty: 10 TABLET | Refills: 11 | Status: SHIPPED | OUTPATIENT
Start: 2023-09-13

## 2023-11-22 ENCOUNTER — PATIENT OUTREACH (OUTPATIENT)
Dept: ADMINISTRATIVE | Facility: HOSPITAL | Age: 43
End: 2023-11-22
Payer: COMMERCIAL

## 2023-11-22 ENCOUNTER — PATIENT MESSAGE (OUTPATIENT)
Dept: NEUROLOGY | Facility: CLINIC | Age: 43
End: 2023-11-22
Payer: COMMERCIAL

## 2023-11-22 ENCOUNTER — PATIENT MESSAGE (OUTPATIENT)
Dept: ADMINISTRATIVE | Facility: HOSPITAL | Age: 43
End: 2023-11-22
Payer: COMMERCIAL

## 2023-11-30 DIAGNOSIS — G43.009 MIGRAINE WITHOUT AURA AND WITHOUT STATUS MIGRAINOSUS, NOT INTRACTABLE: ICD-10-CM

## 2023-12-05 RX ORDER — GALCANEZUMAB 120 MG/ML
120 INJECTION, SOLUTION SUBCUTANEOUS
Qty: 3 ML | Refills: 3 | Status: SHIPPED | OUTPATIENT
Start: 2023-12-05 | End: 2024-12-04

## 2023-12-19 ENCOUNTER — TELEPHONE (OUTPATIENT)
Dept: INTERNAL MEDICINE | Facility: CLINIC | Age: 43
End: 2023-12-19
Payer: COMMERCIAL

## 2023-12-20 ENCOUNTER — OFFICE VISIT (OUTPATIENT)
Dept: INTERNAL MEDICINE | Facility: CLINIC | Age: 43
End: 2023-12-20
Payer: COMMERCIAL

## 2023-12-20 DIAGNOSIS — S01.309A: Primary | ICD-10-CM

## 2023-12-20 PROCEDURE — 1159F MED LIST DOCD IN RCRD: CPT | Mod: CPTII,95,, | Performed by: PHYSICIAN ASSISTANT

## 2023-12-20 PROCEDURE — 1159F PR MEDICATION LIST DOCUMENTED IN MEDICAL RECORD: ICD-10-PCS | Mod: CPTII,95,, | Performed by: PHYSICIAN ASSISTANT

## 2023-12-20 PROCEDURE — 99214 OFFICE O/P EST MOD 30 MIN: CPT | Mod: 95,,, | Performed by: PHYSICIAN ASSISTANT

## 2023-12-20 PROCEDURE — 99214 PR OFFICE/OUTPT VISIT, EST, LEVL IV, 30-39 MIN: ICD-10-PCS | Mod: 95,,, | Performed by: PHYSICIAN ASSISTANT

## 2023-12-20 RX ORDER — MUPIROCIN 20 MG/G
OINTMENT TOPICAL 2 TIMES DAILY
Qty: 30 G | Refills: 0 | Status: SHIPPED | OUTPATIENT
Start: 2023-12-20 | End: 2023-12-25

## 2023-12-20 NOTE — PROGRESS NOTES
57 year old man who presents as a transfer for possible leukemia after manual differential demonstrated blasts.   - BMBx performed 9/16/20, showing AML with flow cytometry showingincreased population of myeloid blasts  showing expression of CD4, CD9, CD11c, CD15, CD13, CD33(97%), CD64, (partial),  HLA-DR, and cytoplasmic myeloperoxidase.  - fish results - t(10;11), flt3 negative, ngs pending.   - Started on 7+3  Day 2    - CBC, CMP, PT/INR, fibrinogen, uric acid, phos daily to assess for TLS and DIC.   - Peripheral smear -  Hyperleukocytosis with >20% circulating blastoid cells   - 2D ECHO with EF of 58%   - Continue allopurinol 300mg BID and on IVF  - Hydrea stopped on 9/19  - Transfuse cyroglobulin if fibrinogen <150  - Transfuse if platelets <10, Hgb<7 and/or patient actively bleeding  - Lopes placed 9/21/20.     INTERNAL MEDICINE URGENT VISIT NOTE    CHIEF COMPLAINT     Chief Complaint   Patient presents with    Wound Check     Wound in R ear       HPI     Erika Avina is a 43 y.o. female who presents for an urgent visit today.    PCP is Blanka Moore MD, patient is new to me.     Wears ear plugs at night to help with sleep this has been her routine for the past 12 years  Usually wears wax ear plugs but recently has changed to foam ear plugs  In the past few weeks she has noticed itching flakiness and pain to the right tragus area.   No discharge or bleeding  No decreased hearing  Has been using triamcinolone 0.25% which only helps temporarily then symptoms seem to return worse than before.   No fever or chills  No concern for FB in ear  Similar symptoms on the left ear but significantly less intense.     Pt is at home during this VV  Face Time is 10 mins   Chart check and documentation is 10 mins.     Past Medical History:  Past Medical History:   Diagnosis Date    Migraine headache        Home Medications:  Prior to Admission medications    Medication Sig Start Date End Date Taking? Authorizing Provider   aspirin (ECOTRIN) 81 MG EC tablet Take 1 tablet (81 mg total) by mouth once daily. 5/29/15 3/17/23  Giancarlo Vazquez MD   eszopiclone (LUNESTA) 2 MG Tab Take 2 mg by mouth nightly as needed. Patient needs refill.    Provider, Historical   galcanezumab-gnlm (EMGALITY PEN) 120 mg/mL PnIj Inject 1 mL (120 mg total) into the skin every 28 days. 12/5/23 12/4/24  Rad Mcguire, DO   magnesium citrate 125 mg Cap Take 250 mg by mouth.    Provider, Historical   magnesium oxide 500 mg Tab Take 1 capsule by mouth once.    Provider, Historical   SUMAtriptan-naproxen (TREXIMET)  mg Tab Take one pill by mouth at the first sign of migraine. Repeat in 2 hrs as needed. Do not take more than 2 tabs in 24 hrs. 5/25/23   Rad Mcguire, DO   traZODone (DESYREL) 50 MG tablet Take 1 tablet (50 mg total) by mouth  "every evening. 5/3/23 5/2/24  Rad Mcguire, DO   ubrogepant (UBRELVY) 100 mg tablet Take 1 tablet (100 mg total) by mouth every 2 (two) hours as needed for Migraine. Max 200mg/24hrs 9/13/23   Rad Mcguire, DO       Review of Systems:  Review of Systems   HENT:  Positive for ear pain. Negative for ear discharge, hearing loss, rhinorrhea and sore throat.    Respiratory:  Negative for cough.    Gastrointestinal:  Negative for abdominal pain, diarrhea and vomiting.   Musculoskeletal:  Negative for neck pain.   Skin:  Negative for rash.   Neurological:  Negative for headaches.       Health Maintainence:   Immunizations:  Health Maintenance         Date Due Completion Date    Hepatitis C Screening Never done ---    Lipid Panel Never done ---    HIV Screening Never done ---    TETANUS VACCINE Never done ---    COVID-19 Vaccine (4 - 2023-24 season) 09/01/2023 1/3/2022    Cervical Cancer Screening 11/18/2023 11/18/2020    Mammogram 08/15/2024 8/15/2023             PHYSICAL EXAM     There were no vitals taken for this visit. VV    Physical Exam  Constitutional:       Comments: Healthy appearing female in NAD or apparent pain. She makes good eye contact, speaks in clear full sentences and ambulates with ease.              LABS     No results found for: "LABA1C", "HGBA1C"  CMP  Sodium   Date Value Ref Range Status   02/24/2010 140 136 - 145 mMol/l Final     Potassium   Date Value Ref Range Status   02/24/2010 4.1 3.5 - 5.1 mMol/l Final     Chloride   Date Value Ref Range Status   02/24/2010 106 95 - 110 mMol/l Final     CO2   Date Value Ref Range Status   02/24/2010 24 23.0 - 29.0 mEq/L Final     Glucose   Date Value Ref Range Status   02/24/2010 79 70 - 110 mg/dl Final     BUN   Date Value Ref Range Status   02/24/2010 19 6 - 20 mg/dl Final     Creatinine   Date Value Ref Range Status   02/24/2010 0.8 0.5 - 1.4 mg/dl Final     Calcium   Date Value Ref Range Status   02/24/2010 9.0 8.7 - 10.5 mg/dl Final     Anion Gap " "  Date Value Ref Range Status   02/24/2010 15 10 - 20 mmol/L Final     eGFR if    Date Value Ref Range Status   02/24/2010 >60 >60 mL/min Final     Comment:     Estimated glomerular filtration rate (eGFR) is normalized to an  average body surface area of 1.73 square meters.  The calculation  used to obtain the eGFR is the adjusted MDRD equation, which factors  patient sex, age, race, and creatinine result.  Since race is unknown  in our information system, the eGFR values for -American  and Non--American patients are given for each creatinine  result.       eGFR if non    Date Value Ref Range Status   02/24/2010 >60 >60 mL/min Final     Lab Results   Component Value Date    WBC 5.65 02/24/2010    HGB 13.1 02/24/2010    HCT 39.7 02/24/2010    MCV 89.2 02/24/2010     02/24/2010     No results found for: "CHOL"  No results found for: "HDL"  No results found for: "LDLCALC"  No results found for: "TRIG"  No results found for: "CHOLHDL"  Lab Results   Component Value Date    TSH 2.77 06/29/2010       ASSESSMENT/PLAN     Erika Avina is a 43 y.o. female     Erika was seen today for wound check. Possible fungal infection but with report of small open wound will start with abx - low threshold to be seen in clinic if symptoms change or worse. ENT referral sent in.     Diagnoses and all orders for this visit:    Open wound of tragus  -     Ambulatory referral/consult to ENT; Future    Other orders  -     mupirocin (BACTROBAN) 2 % ointment; Apply topically 2 (two) times daily. for 5 days     Patient was counseled on when and how to seek emergent care.       Rica Godinez PA-C   Department of Internal Medicine - [facility]   3:06 PM     Answers submitted by the patient for this visit:  Ear Pain Questionnaire (Submitted on 12/20/2023)  Chief Complaint: Ear pain  Affected ear: right  Chronicity: new  Onset: more than 1 month ago  Progression since onset: " waxing and waning  Pain - numeric: 2/10  drainage: No  Improvement on treatment: mild  Pain severity: mild  chronic ear infection: No  hearing loss: No  tympanostomy tube: No

## 2023-12-21 ENCOUNTER — TELEPHONE (OUTPATIENT)
Dept: INTERNAL MEDICINE | Facility: CLINIC | Age: 43
End: 2023-12-21
Payer: COMMERCIAL

## 2024-01-10 ENCOUNTER — OFFICE VISIT (OUTPATIENT)
Dept: INTERNAL MEDICINE | Facility: CLINIC | Age: 44
End: 2024-01-10
Payer: COMMERCIAL

## 2024-01-10 VITALS
BODY MASS INDEX: 21.12 KG/M2 | HEART RATE: 73 BPM | WEIGHT: 123 LBS | OXYGEN SATURATION: 100 % | DIASTOLIC BLOOD PRESSURE: 60 MMHG | SYSTOLIC BLOOD PRESSURE: 114 MMHG

## 2024-01-10 DIAGNOSIS — Z13.1 SCREENING FOR DIABETES MELLITUS: ICD-10-CM

## 2024-01-10 DIAGNOSIS — Z13.6 SCREENING FOR CARDIOVASCULAR CONDITION: ICD-10-CM

## 2024-01-10 DIAGNOSIS — G47.00 INSOMNIA, UNSPECIFIED TYPE: ICD-10-CM

## 2024-01-10 DIAGNOSIS — Z11.4 SCREENING FOR HIV WITHOUT PRESENCE OF RISK FACTORS: ICD-10-CM

## 2024-01-10 DIAGNOSIS — N92.6 MENSTRUAL IRREGULARITY: ICD-10-CM

## 2024-01-10 DIAGNOSIS — H92.01 RIGHT EAR PAIN: ICD-10-CM

## 2024-01-10 DIAGNOSIS — Z00.00 HEALTH MAINTENANCE EXAMINATION: Primary | ICD-10-CM

## 2024-01-10 DIAGNOSIS — G43.009 MIGRAINE WITHOUT AURA AND WITHOUT STATUS MIGRAINOSUS, NOT INTRACTABLE: ICD-10-CM

## 2024-01-10 DIAGNOSIS — Z11.59 ENCOUNTER FOR HEPATITIS C SCREENING TEST FOR LOW RISK PATIENT: ICD-10-CM

## 2024-01-10 PROCEDURE — 99999 PR PBB SHADOW E&M-EST. PATIENT-LVL III: CPT | Mod: PBBFAC,,, | Performed by: STUDENT IN AN ORGANIZED HEALTH CARE EDUCATION/TRAINING PROGRAM

## 2024-01-10 PROCEDURE — 3078F DIAST BP <80 MM HG: CPT | Mod: CPTII,S$GLB,, | Performed by: STUDENT IN AN ORGANIZED HEALTH CARE EDUCATION/TRAINING PROGRAM

## 2024-01-10 PROCEDURE — 1159F MED LIST DOCD IN RCRD: CPT | Mod: CPTII,S$GLB,, | Performed by: STUDENT IN AN ORGANIZED HEALTH CARE EDUCATION/TRAINING PROGRAM

## 2024-01-10 PROCEDURE — 99396 PREV VISIT EST AGE 40-64: CPT | Mod: S$GLB,,, | Performed by: STUDENT IN AN ORGANIZED HEALTH CARE EDUCATION/TRAINING PROGRAM

## 2024-01-10 PROCEDURE — 3008F BODY MASS INDEX DOCD: CPT | Mod: CPTII,S$GLB,, | Performed by: STUDENT IN AN ORGANIZED HEALTH CARE EDUCATION/TRAINING PROGRAM

## 2024-01-10 PROCEDURE — 99213 OFFICE O/P EST LOW 20 MIN: CPT | Mod: 25,S$GLB,, | Performed by: STUDENT IN AN ORGANIZED HEALTH CARE EDUCATION/TRAINING PROGRAM

## 2024-01-10 PROCEDURE — 1160F RVW MEDS BY RX/DR IN RCRD: CPT | Mod: CPTII,S$GLB,, | Performed by: STUDENT IN AN ORGANIZED HEALTH CARE EDUCATION/TRAINING PROGRAM

## 2024-01-10 PROCEDURE — 3074F SYST BP LT 130 MM HG: CPT | Mod: CPTII,S$GLB,, | Performed by: STUDENT IN AN ORGANIZED HEALTH CARE EDUCATION/TRAINING PROGRAM

## 2024-01-10 RX ORDER — NEOMYCIN SULFATE, POLYMYXIN B SULFATE AND HYDROCORTISONE 10; 3.5; 1 MG/ML; MG/ML; [USP'U]/ML
3 SUSPENSION/ DROPS AURICULAR (OTIC) 3 TIMES DAILY
Qty: 10 ML | Refills: 0 | Status: SHIPPED | OUTPATIENT
Start: 2024-01-10 | End: 2024-01-15

## 2024-01-10 NOTE — PROGRESS NOTES
Subjective:       Patient ID: Erika Avina is a 43 y.o. female.    Chief Complaint: Health maintenance examination [Z00.00]    Patient is established with me, here today for the following:    Health maintenance -   Denies family history of colorectal cancer.  Mammogram BI-RADS 1 in .   Denies history of abnormal mammogram.  Denies family history of breast cancer.  Denies family history of ovarian cancer.  Last pap performed .   Seeing Dr. Andrews for gynecology  Denies history of prior abnormal pap smears.  Significant family history of cardiac disease.  UTD on COVID19 primary/booster, influenza vaccinations.  Unsure date of Tdap vaccinations.   Due for COVID vaccinations.   Never smoker.  Drinks alcohol 3-4 times weekly, 1-2 drinks per sitting.  Denies drug use.  Due for HIV and hepatitis C screening.  Due for lipid screening.  Due for diabetes screening.  Biking for cardio, walking routinely.  Endorses active lifestyle.     Has irregular menstrual cycles now.  Having 23-25 days cycles, however this past cycle 22 days.  Menstruation lasts for 4-5 days.  Endorses menorrhagia on days 1-2.  Began menarche at age 17.   [id number]  Denies gestational diabetes and HTN.  Currently sexually active with .  Partner had vasectomy for contraception.     Migraines -   Taking sumatriptan-naproxen PRN for abortive with good effect  Taking Ubrelvy PRN for maintenance, but not currently requiring  Receiving Emgality injections for maintenance with good control  Following with Dr. Mcguire routinely for neurology, needs to establish with new provider.    Taking trazodone PRN for insomnia with good effect    Endorses right ear pain for the past few days        Review of Systems   Constitutional:  Negative for activity change, fatigue and fever.   HENT:  Positive for ear pain.    Respiratory:  Negative for cough and shortness of breath.    Cardiovascular:  Negative for chest pain and palpitations.    Gastrointestinal:  Negative for abdominal pain, constipation, diarrhea, nausea and vomiting.   Neurological:  Negative for syncope and headaches.         Current Outpatient Medications   Medication Instructions    aspirin (ECOTRIN) 81 mg, Oral, Daily    EMGALITY  mg, Subcutaneous, Every 28 days    eszopiclone (LUNESTA) 2 mg, Oral, Nightly PRN, Patient needs refill.    magnesium citrate 250 mg, Oral    magnesium oxide 500 mg Tab 1 capsule, Oral, Once    SUMAtriptan-naproxen (TREXIMET)  mg Tab Take one pill by mouth at the first sign of migraine. Repeat in 2 hrs as needed. Do not take more than 2 tabs in 24 hrs.    traZODone (DESYREL) 50 mg, Oral, Nightly    UBRELVY 100 mg, Oral, Every 2 hours PRN, Max 200mg/24hrs     Objective:      Vitals:    01/10/24 1512   BP: 114/60   Pulse: 73   SpO2: 100%   Weight: 55.8 kg (123 lb 0.3 oz)   PainSc: 0-No pain     Body mass index is 21.12 kg/m².    Physical Exam  Vitals reviewed.   Constitutional:       General: She is not in acute distress.     Appearance: Normal appearance. She is not ill-appearing or diaphoretic.   HENT:      Head: Normocephalic and atraumatic.      Right Ear: Tympanic membrane normal. Swelling (erythema and edema to external canal) and tenderness present.      Left Ear: Tympanic membrane, ear canal and external ear normal. There is no impacted cerumen.      Nose: Nose normal. No rhinorrhea.      Mouth/Throat:      Mouth: Mucous membranes are moist.      Pharynx: Oropharynx is clear. No oropharyngeal exudate or posterior oropharyngeal erythema.   Eyes:      General: No scleral icterus.        Right eye: No discharge.         Left eye: No discharge.      Conjunctiva/sclera: Conjunctivae normal.   Neck:      Thyroid: No thyromegaly or thyroid tenderness.      Trachea: Trachea normal.   Cardiovascular:      Rate and Rhythm: Normal rate and regular rhythm.      Heart sounds: Normal heart sounds. No murmur heard.     No friction rub. No gallop.    Pulmonary:      Effort: Pulmonary effort is normal. No respiratory distress.      Breath sounds: Normal breath sounds. No stridor. No wheezing, rhonchi or rales.   Abdominal:      General: There is no distension.      Palpations: Abdomen is soft.      Tenderness: There is no abdominal tenderness. There is no guarding or rebound.   Musculoskeletal:         General: No swelling or deformity.      Cervical back: Neck supple.   Lymphadenopathy:      Head:      Right side of head: No submandibular or posterior auricular adenopathy.      Left side of head: No submandibular or posterior auricular adenopathy.      Cervical: No cervical adenopathy.      Right cervical: No superficial, deep or posterior cervical adenopathy.     Left cervical: No superficial, deep or posterior cervical adenopathy.      Upper Body:      Right upper body: No supraclavicular adenopathy.      Left upper body: No supraclavicular adenopathy.   Skin:     General: Skin is warm and dry.   Neurological:      General: No focal deficit present.      Mental Status: She is alert. Mental status is at baseline.      Gait: Gait normal.   Psychiatric:         Mood and Affect: Mood normal.         Behavior: Behavior normal.         Assessment:       1. Health maintenance examination    2. Migraine without aura and without status migrainosus, not intractable    3. Insomnia, unspecified type    4. Right ear pain    5. Need for vaccination    6. Screening for cardiovascular condition    7. Screening for diabetes mellitus    8. Menstrual irregularity    9. Screening for HIV without presence of risk factors    10. Encounter for hepatitis C screening test for low risk patient        Plan:       Migraine without aura and without status migrainosus, not intractable  Continue medication, evaluation, and management per neurologist.  -     Comprehensive Metabolic Panel; Future  -     CBC Auto Differential; Future    Insomnia, unspecified type  Continue current  medications.    Right ear pain  RTC PRN .  -     neomycin-polymyxin-hydrocortisone (CORTISPORIN) 3.5-10,000-1 mg/mL-unit/mL-% otic suspension; Place 3 drops into the right ear 3 (three) times daily. for 5 days    Health maintenance examination  Reviewed and discussed age appropriate screenings and immunizations.  -     Comprehensive Metabolic Panel; Future  -     TSH; Future  -     Lipid Panel; Future  -     Hemoglobin A1C; Future  -     CBC Auto Differential; Future  -     HIV 1/2 Ag/Ab (4th Gen); Future  -     Hepatitis C Antibody; Future  -     FOLLICLE STIMULATING HORMONE; Future    Screening for cardiovascular condition  -     Lipid Panel; Future    Screening for diabetes mellitus  -     Hemoglobin A1C; Future    Menstrual irregularity  -     TSH; Future  -     FOLLICLE STIMULATING HORMONE; Future    Screening for HIV without presence of risk factors  -     HIV 1/2 Ag/Ab (4th Gen); Future    Encounter for hepatitis C screening test for low risk patient  -     Hepatitis C Antibody; Future      Blanka Moore MD  1/10/2024

## 2024-01-10 NOTE — PATIENT INSTRUCTIONS
- Hydration goals of 2-3 liters of water daily.   - Increase dietary fiber. Start a psyllium husk based supplement such as Metamucil or wheat dextrin based supplement such as Benefiber daily.   - Recommend Miralax for constipation symptoms including decreased frequency, bloating, straining, or discomfort. Start 17g daily, then increase to twice daily if ineffective or decrease to as needed if constipation relieved.   - May try stool softeners such as Colace daily.   - If symptoms still uncontrolled with the above, may try stimulant laxatives such as mag citrate, milk of mag, or senna. There is a risk for abdominal cramping with stimulant laxatives. Avoid using frequently.

## 2024-03-01 ENCOUNTER — TELEPHONE (OUTPATIENT)
Dept: INTERNAL MEDICINE | Facility: CLINIC | Age: 44
End: 2024-03-01
Payer: COMMERCIAL

## 2024-05-13 ENCOUNTER — ON-DEMAND VIRTUAL (OUTPATIENT)
Dept: URGENT CARE | Facility: CLINIC | Age: 44
End: 2024-05-13
Payer: COMMERCIAL

## 2024-05-13 NOTE — PROGRESS NOTES
"  Joined the call but it says "waiting for others to connect..."     Waited for 2 minutes then left.     Will need to sing in again.     This encounter was created in error - please disregard.  "

## 2024-05-14 ENCOUNTER — ON-DEMAND VIRTUAL (OUTPATIENT)
Dept: URGENT CARE | Facility: CLINIC | Age: 44
End: 2024-05-14
Payer: COMMERCIAL

## 2024-05-14 DIAGNOSIS — H10.029 PINK EYE DISEASE, UNSPECIFIED LATERALITY: Primary | ICD-10-CM

## 2024-05-14 PROCEDURE — 99212 OFFICE O/P EST SF 10 MIN: CPT | Mod: 95,,, | Performed by: FAMILY MEDICINE

## 2024-05-14 RX ORDER — POLYMYXIN B SULFATE AND TRIMETHOPRIM 1; 10000 MG/ML; [USP'U]/ML
1 SOLUTION OPHTHALMIC EVERY 6 HOURS
Qty: 10 ML | Refills: 0 | Status: SHIPPED | OUTPATIENT
Start: 2024-05-14 | End: 2024-05-21

## 2024-05-14 NOTE — PROGRESS NOTES
Subjective:      Patient ID: Erika Avina is a 44 y.o. female.    Vitals:  vitals were not taken for this visit.     Chief Complaint: Eye Problem (Pink eye)      Visit Type: TELE AUDIOVISUAL    Present with the patient at the time of consultation: TELEMED PRESENT WITH PATIENT: None    Past Medical History:   Diagnosis Date    Migraine headache      Past Surgical History:   Procedure Laterality Date    WISDOM TOOTH EXTRACTION       Review of patient's allergies indicates:  No Known Allergies  Current Outpatient Medications on File Prior to Visit   Medication Sig Dispense Refill    aspirin (ECOTRIN) 81 MG EC tablet Take 1 tablet (81 mg total) by mouth once daily.  0    galcanezumab-gnlm (EMGALITY PEN) 120 mg/mL PnIj Inject 1 mL (120 mg total) into the skin every 28 days. 3 mL 3    SUMAtriptan-naproxen (TREXIMET)  mg Tab Take one pill by mouth at the first sign of migraine. Repeat in 2 hrs as needed. Do not take more than 2 tabs in 24 hrs. 9 tablet 11    traZODone (DESYREL) 50 MG tablet Take 1 tablet (50 mg total) by mouth every evening. 30 tablet 2    ubrogepant (UBRELVY) 100 mg tablet Take 1 tablet (100 mg total) by mouth every 2 (two) hours as needed for Migraine. Max 200mg/24hrs 10 tablet 11     No current facility-administered medications on file prior to visit.     Family History   Problem Relation Name Age of Onset    No Known Problems Mother      Atrial fibrillation Father      No Known Problems Brother      Aortic aneurysm Maternal Grandmother          AAA    Coronary artery disease Maternal Grandmother          CABG    Pulmonary fibrosis Maternal Grandmother          idiopathic    Heart attack Maternal Grandfather      Skin cancer Maternal Grandfather      Prostate cancer Paternal Grandfather          metastasized intestinal    Colon cancer Neg Hx      Breast cancer Neg Hx      Ovarian cancer Neg Hx      Stroke Neg Hx      Diabetes Neg Hx             [facility] Peq Odvv Intake     5/14/2024  2:25 PM CDT - Filed by Patient   What is your current physical address in the event of a medical emergency? [id number]   Are you able to take your vital signs? No   Please attach any relevant images or files          43 yo female with pink eye symptoms that improved with  another patients eye drops a few weeks ago  Now patient has return of symptoms with yellow discharge and crust since this morning  Does not think she has allergies    Eye Problem   Associated symptoms include an eye discharge, eye redness and itching.       Eyes:  Positive for eye discharge, eye itching and eye redness.        Objective:   The physical exam was conducted virtually.  Physical Exam   Constitutional: She is oriented to person, place, and time. She does not appear ill. No distress.   HENT:   Head: Normocephalic and atraumatic.   Pulmonary/Chest: Effort normal. No respiratory distress. She has no wheezes.   Neurological: She is alert and oriented to person, place, and time.       Assessment:     1. Pink eye disease, unspecified laterality        Plan:       Pink eye disease, unspecified laterality

## 2024-07-01 ENCOUNTER — OFFICE VISIT (OUTPATIENT)
Dept: NEUROLOGY | Facility: CLINIC | Age: 44
End: 2024-07-01
Payer: COMMERCIAL

## 2024-07-01 VITALS
DIASTOLIC BLOOD PRESSURE: 71 MMHG | WEIGHT: 125 LBS | BODY MASS INDEX: 21.34 KG/M2 | HEIGHT: 64 IN | HEART RATE: 58 BPM | SYSTOLIC BLOOD PRESSURE: 103 MMHG

## 2024-07-01 DIAGNOSIS — G43.009 MIGRAINE WITHOUT AURA AND WITHOUT STATUS MIGRAINOSUS, NOT INTRACTABLE: Primary | ICD-10-CM

## 2024-07-01 PROCEDURE — 99999 PR PBB SHADOW E&M-EST. PATIENT-LVL III: CPT | Mod: PBBFAC,,,

## 2024-07-01 RX ORDER — GALCANEZUMAB 120 MG/ML
120 INJECTION, SOLUTION SUBCUTANEOUS
Qty: 12 ML | Refills: 0 | Status: SHIPPED | OUTPATIENT
Start: 2024-07-01 | End: 2025-07-01

## 2024-07-01 RX ORDER — SUMATRIPTAN AND NAPROXEN SODIUM 85; 500 MG/1; MG/1
TABLET, FILM COATED ORAL
Qty: 9 TABLET | Refills: 11 | Status: SHIPPED | OUTPATIENT
Start: 2024-07-01

## 2024-07-01 RX ORDER — UBROGEPANT 100 MG/1
100 TABLET ORAL
Qty: 30 TABLET | Refills: 11 | Status: SHIPPED | OUTPATIENT
Start: 2024-07-01

## 2024-07-01 NOTE — PROGRESS NOTES
"New Patient     SUBJECTIVE:  Patient ID: Erika Avina   MRN: [id number]  Referred By: No ref. provider found  Chief Complaint: No chief complaint on file.      History of Present Illness:   44 y.o. female with chronic migraines, PFO, who presents to clinic alone for evaluation of headaches.    Patient was previously established with Dr. Mcguire and transferring care to me today for chronic migraines. Last regimen included: emgality, sumatriptan-naproxen "treximet", or ubrelvy prn     Patient has a long history of migraines and reports "emgality has truly changed my life". Since starting emgality, her migraines frequency went from 20/30 to 3/30 days per month. Her headache severity went from 8/10 to 1/10. She uses treximent as her primary rescue and ubrelvy as a second line rescue option. She notices a wearing off effect of emgality on week 3 and tends to use ubrelvy at this time. She is happy with current medication regimen. Still menstruating.     PMHx negative for TBI, Meningitis, Aneurysms, Kidney Stones, asthma, GI bleed, osteoporosis, CAD/MI, CVA/TIA, DM, cancer, pregnancy     Family Hx positive for Migraines in grandmother     Headache History:  Onset - 3rd grade  Location/Radiation - bilateral temporalis, frontalis, occipitalis   Quality - throbbing, achy, stabbing   Duration - 12 hours -3 days  Intensity (range) - 1/10 (prior to Emgality 8/10)  Frequency - 3/30 ha days per month (20/30 prior to emgality), 0/30 are debilitating  Triggers - menstrual cycle, stress, alcohol  Aggravating Factors - movement, light, sound  Alleviating Factors - sleep, dark room, quiet  Recent Changes - no  Prodrome/Aura - no  HA today? - no  Time of day of most headaches- anytime   Sleep - no snoring, wakes feeling refreshed, resolution of headache with sleep    Associated symptoms with the headache:   Meningeal symptoms - photophobia, phonophobia, exercise intolerance   Nausea/vomitting  Nasal drainage   Visual blurriness "   Pallor/flushing  Dizziness   Vertigo  Confusion  Impaired concentration   Pain worsened with physical activity   Neck pain         Treatments Tried   Sumtriptan-naproxen (Treximet)*  Eletriptan   Rizatriptan   Ubrelvy*  Topiramate - didn't work   Amitriptylline   Atenolol   Neviolol   Emgality*  Aimovig   Trazadone 50mg*  Lunesta  Magnesium oxide 500mg - c/o reflux and diarrhea    Social History  Alcohol - socially  Smoke - denies  Recreational Drug Use- denies    Current Medications:    Current Outpatient Medications:     aspirin (ECOTRIN) 81 MG EC tablet, Take 1 tablet (81 mg total) by mouth once daily., Disp: , Rfl: 0    galcanezumab-gnlm (EMGALITY PEN) 120 mg/mL PnIj, Inject 1 mL (120 mg total) into the skin every 28 days., Disp: 3 mL, Rfl: 3    SUMAtriptan-naproxen (TREXIMET)  mg Tab, Take one pill by mouth at the first sign of migraine. Repeat in 2 hrs as needed. Do not take more than 2 tabs in 24 hrs., Disp: 9 tablet, Rfl: 11    traZODone (DESYREL) 50 MG tablet, Take 1 tablet (50 mg total) by mouth every evening., Disp: 30 tablet, Rfl: 2    ubrogepant (UBRELVY) 100 mg tablet, Take 1 tablet (100 mg total) by mouth every 2 (two) hours as needed for Migraine. Max 200mg/24hrs, Disp: 10 tablet, Rfl: 11    Review of Systems - as per HPI, otherwise a balanced 10 systems review is negative.    OBJECTIVE:  Vitals:  There were no vitals taken for this visit.    Physical Exam   Constitutional: she appears well-developed and well-nourished. she is well groomed. NAD  HENT:    Head: Normocephalic and atraumatic, Frontalis was NTTP, temporalis was NTTP   Eyes: Conjunctivae and EOM are normal. Pupils are equal, round, and reactive to light   Neck: Neck supple. Occiput and trapezius NTTP   Musculoskeletal: Normal range of motion. No joint stiffness. No vertebral point tenderness.  Skin: Skin is warm and dry.  Psychiatric: Normal mood and affect.     Neuro exam:    Mental status:  The patient is alert and oriented to  person, place and time.  Language is intact and fluent  Remote and recent memory are intact  Normal attention and concentration  Mood is stable    Cranial Nerves:  Fundoscopic examination does not reveal any occult papilledema.    Pupils are equal and reactive to light.    Extraocular movements are intact and without nystagmus.    Visual fields are full to confrontation testing.   Facial movement is symmetric.  Facial sensation is intact.    Hearing is intact   FROM of neck in all (6) directions without pain  Shoulder shrug symmetrical.    Coordination:     Finger to nose - normal and symmetric bilaterally     Motor:  Normal muscle bulk and symmetry. No fasciculations were noted.   Tremor not apparent   Pronator drift not apparent.    strength was strong and symmetric  Finger extension strength was strong and symmetric  RUE:appropriate against gravity and medium force as tested 5/5  LUE: appropriate against gravity and medium force as tested 5/5  RLE:appropriate against gravity and medium force as tested 5/5              LLE: appropriate against gravity and medium force as tested 5/5    Reflexes:  Right Brachioradialis 2+  Left Brachioradialis 2+  Right Biceps 2+  Left Biceps 2+  Right Patellar2+  Left Patellar 2+    Sensory:  RUE  intact light touch  LUE intact light touch  RLE intact light touch  LLE intact light touch    Gait:   Romberg - negative  Normal gait  Tandem, Heel, and Toe Walk - able to perform without difficulty    Review of Data:   Notes from neurology reviewed   Labs:  No visits with results within 3 Month(s) from this visit.   Latest known visit with results is:   Clinical Support on 06/29/2020   Component Date Value Ref Range Status    SARS-CoV2 (COVID-19) Qualitative P* 06/29/2020 Not Detected   Final     Imaging:  No results found for this or any previous visit.  Note: I have independently reviewed any/all imaging/labs/tests and agree with the report (s) as documented.  Any discrepancies  will be as noted/demarcated by free text.  BRAYAN BALDERAS-MAXIMINO 7/1/2024    ASSESSMENT:  No diagnosis found.      PLAN:  - Discussed symptoms appear to be consistent with chronic migraines, discussed treatment options and patient agreed with the following plan:    - continue emgality for migraine prevention. Medically necessary as patient has tried and failed several other medication options (see above).   - continue Treximet for rescue medication.  - continue ubrelvy as secondary rescue option. Medically necessary as patient has tried and failed multiple other triptans.    - alternative treatment options offered   - importance of healthy diet, regular exercise and sleep hygiene in the treatment of headaches    - Start tracking headaches via Migraine Buddy norberto on phone   - RTC in 1 year.          I have discussed realistic goals of care with patient at length as well as medication options, and need for lifestyle adjustment. I have explained that treatment will take time. We have agreed that the goal will be to reduce frequency/intensity/quantity of HA, not to be completely HA free. I have explained my non narcotic policy regarding headache treatment.    Patient agreeable to work on lifestyle adjustments.    Discussed potential for teratogenicity with treatment, patient understands if her family planning status should change she will contact office immediately and we will safely adjust medications as needed.     Questions and concerns were sought and answered to the patient's stated verbal satisfaction.  The patient verbalizes understanding and agreement with the above stated treatment plan.     CC: Blanka Moore MD Monique Smith, DORYP-C  [facility]  [phone]    Dr. Correa was available during today's encounter.     100% of this 42 minute visit was spent face to face, and 50% or more of this visit included discussion of the treatment plan, symptom management, and coordination of care. Questions  and concerns were sought and answered to the patient's stated verbal satisfaction.  The patient verbalizes understanding and agreement with the above stated treatment plan.

## 2024-08-07 ENCOUNTER — TELEPHONE (OUTPATIENT)
Dept: INTERNAL MEDICINE | Facility: CLINIC | Age: 44
End: 2024-08-07
Payer: COMMERCIAL

## 2024-08-07 DIAGNOSIS — Z11.59 ENCOUNTER FOR HEPATITIS C SCREENING TEST FOR LOW RISK PATIENT: Primary | ICD-10-CM

## 2024-09-03 ENCOUNTER — HOSPITAL ENCOUNTER (OUTPATIENT)
Dept: RADIOLOGY | Facility: HOSPITAL | Age: 44
Discharge: HOME | End: 2024-09-03
Attending: STUDENT IN AN ORGANIZED HEALTH CARE EDUCATION/TRAINING PROGRAM
Payer: COMMERCIAL

## 2024-09-03 DIAGNOSIS — Z12.31 ENCOUNTER FOR SCREENING MAMMOGRAM FOR BREAST CANCER: ICD-10-CM

## 2024-09-03 PROCEDURE — 77067 SCR MAMMO BI INCL CAD: CPT | Mod: 26,,, | Performed by: RADIOLOGY

## 2024-09-03 PROCEDURE — 77063 BREAST TOMOSYNTHESIS BI: CPT | Mod: 26,,, | Performed by: RADIOLOGY

## 2024-09-03 PROCEDURE — 77067 SCR MAMMO BI INCL CAD: CPT | Mod: TC

## 2024-10-02 ENCOUNTER — TELEPHONE (OUTPATIENT)
Dept: INTERNAL MEDICINE | Facility: CLINIC | Age: 44
End: 2024-10-02
Payer: COMMERCIAL

## 2024-10-03 ENCOUNTER — PATIENT MESSAGE (OUTPATIENT)
Dept: INTERNAL MEDICINE | Facility: CLINIC | Age: 44
End: 2024-10-03
Payer: COMMERCIAL

## 2024-10-03 DIAGNOSIS — G47.00 INSOMNIA, UNSPECIFIED TYPE: ICD-10-CM

## 2024-10-03 RX ORDER — TRAZODONE HYDROCHLORIDE 50 MG/1
50 TABLET ORAL NIGHTLY
Qty: 30 TABLET | Refills: 2 | Status: SHIPPED | OUTPATIENT
Start: 2024-10-03 | End: 2025-10-03

## 2024-10-03 NOTE — TELEPHONE ENCOUNTER
No care due was identified.  [facility] Embedded Care Due Messages. Reference number: [id number].   10/03/2024 11:47:33 AM CDT

## 2025-02-18 ENCOUNTER — PATIENT MESSAGE (OUTPATIENT)
Facility: CLINIC | Age: 45
End: 2025-02-18
Payer: COMMERCIAL

## 2025-03-17 ENCOUNTER — PATIENT MESSAGE (OUTPATIENT)
Facility: CLINIC | Age: 45
End: 2025-03-17
Payer: COMMERCIAL

## 2025-03-17 DIAGNOSIS — G43.009 MIGRAINE WITHOUT AURA AND WITHOUT STATUS MIGRAINOSUS, NOT INTRACTABLE: Primary | ICD-10-CM

## 2025-03-18 RX ORDER — SUMATRIPTAN SUCCINATE 100 MG/1
TABLET ORAL
Qty: 18 TABLET | Refills: 2 | Status: SHIPPED | OUTPATIENT
Start: 2025-03-18 | End: 2025-06-18

## 2025-03-18 RX ORDER — NAPROXEN 500 MG/1
500 TABLET ORAL 2 TIMES DAILY PRN
Qty: 20 TABLET | Refills: 2 | Status: SHIPPED | OUTPATIENT
Start: 2025-03-18 | End: 2025-06-18

## 2025-07-08 ENCOUNTER — RESULTS FOLLOW-UP (OUTPATIENT)
Dept: INTERNAL MEDICINE | Facility: CLINIC | Age: 45
End: 2025-07-08

## 2025-07-08 ENCOUNTER — LAB VISIT (OUTPATIENT)
Dept: LAB | Facility: OTHER | Age: 45
End: 2025-07-08
Attending: COUNSELOR
Payer: COMMERCIAL

## 2025-07-08 ENCOUNTER — OFFICE VISIT (OUTPATIENT)
Dept: INTERNAL MEDICINE | Facility: CLINIC | Age: 45
End: 2025-07-08
Payer: COMMERCIAL

## 2025-07-08 VITALS
SYSTOLIC BLOOD PRESSURE: 118 MMHG | OXYGEN SATURATION: 98 % | BODY MASS INDEX: 21.15 KG/M2 | WEIGHT: 123.88 LBS | DIASTOLIC BLOOD PRESSURE: 62 MMHG | HEIGHT: 64 IN | HEART RATE: 74 BPM

## 2025-07-08 DIAGNOSIS — Z13.6 SCREENING FOR CARDIOVASCULAR CONDITION: ICD-10-CM

## 2025-07-08 DIAGNOSIS — G43.709 CHRONIC MIGRAINE WITHOUT AURA WITHOUT STATUS MIGRAINOSUS, NOT INTRACTABLE: ICD-10-CM

## 2025-07-08 DIAGNOSIS — G43.009 MIGRAINE WITHOUT AURA AND WITHOUT STATUS MIGRAINOSUS, NOT INTRACTABLE: ICD-10-CM

## 2025-07-08 DIAGNOSIS — Z13.1 SCREENING FOR DIABETES MELLITUS: ICD-10-CM

## 2025-07-08 DIAGNOSIS — Z11.59 ENCOUNTER FOR HEPATITIS C SCREENING TEST FOR LOW RISK PATIENT: ICD-10-CM

## 2025-07-08 DIAGNOSIS — Z00.00 HEALTH MAINTENANCE EXAMINATION: ICD-10-CM

## 2025-07-08 DIAGNOSIS — H60.8X3 CHRONIC ECZEMATOUS OTITIS EXTERNA OF BOTH EARS: ICD-10-CM

## 2025-07-08 DIAGNOSIS — K59.00 CONSTIPATION, UNSPECIFIED CONSTIPATION TYPE: ICD-10-CM

## 2025-07-08 DIAGNOSIS — Z11.4 SCREENING FOR HIV WITHOUT PRESENCE OF RISK FACTORS: ICD-10-CM

## 2025-07-08 DIAGNOSIS — Z00.00 HEALTH MAINTENANCE EXAMINATION: Primary | ICD-10-CM

## 2025-07-08 LAB
ABSOLUTE EOSINOPHIL (OHS): 0.11 K/UL
ABSOLUTE MONOCYTE (OHS): 0.19 K/UL (ref 0.3–1)
ABSOLUTE NEUTROPHIL COUNT (OHS): 3.79 K/UL (ref 1.8–7.7)
ALBUMIN SERPL BCP-MCNC: 4.3 G/DL (ref 3.5–5.2)
ALP SERPL-CCNC: 54 UNIT/L (ref 40–150)
ALT SERPL W/O P-5'-P-CCNC: 17 UNIT/L (ref 10–44)
ANION GAP (OHS): 12 MMOL/L (ref 8–16)
AST SERPL-CCNC: 21 UNIT/L (ref 11–45)
BASOPHILS # BLD AUTO: 0.03 K/UL
BASOPHILS NFR BLD AUTO: 0.6 %
BILIRUB SERPL-MCNC: 0.5 MG/DL (ref 0.1–1)
BUN SERPL-MCNC: 14 MG/DL (ref 6–20)
CALCIUM SERPL-MCNC: 9.7 MG/DL (ref 8.7–10.5)
CHLORIDE SERPL-SCNC: 103 MMOL/L (ref 95–110)
CHOLEST SERPL-MCNC: 180 MG/DL (ref 120–199)
CHOLEST/HDLC SERPL: 2.5 {RATIO} (ref 2–5)
CO2 SERPL-SCNC: 26 MMOL/L (ref 23–29)
CREAT SERPL-MCNC: 0.8 MG/DL (ref 0.5–1.4)
EAG (OHS): 105 MG/DL (ref 68–131)
ERYTHROCYTE [DISTWIDTH] IN BLOOD BY AUTOMATED COUNT: 12.8 % (ref 11.5–14.5)
GFR SERPLBLD CREATININE-BSD FMLA CKD-EPI: >60 ML/MIN/1.73/M2
GLUCOSE SERPL-MCNC: 64 MG/DL (ref 70–110)
HBA1C MFR BLD: 5.3 % (ref 4–5.6)
HCT VFR BLD AUTO: 43.7 % (ref 37–48.5)
HCV AB SERPL QL IA: NEGATIVE
HDLC SERPL-MCNC: 73 MG/DL (ref 40–75)
HDLC SERPL: 40.6 % (ref 20–50)
HGB BLD-MCNC: 14.4 GM/DL (ref 12–16)
HIV 1+2 AB+HIV1 P24 AG SERPL QL IA: NEGATIVE
IMM GRANULOCYTES # BLD AUTO: 0.01 K/UL (ref 0–0.04)
IMM GRANULOCYTES NFR BLD AUTO: 0.2 % (ref 0–0.5)
LDLC SERPL CALC-MCNC: 87.6 MG/DL (ref 63–159)
LYMPHOCYTES # BLD AUTO: 1.29 K/UL (ref 1–4.8)
MCH RBC QN AUTO: 30.8 PG (ref 27–31)
MCHC RBC AUTO-ENTMCNC: 33 G/DL (ref 32–36)
MCV RBC AUTO: 94 FL (ref 82–98)
NONHDLC SERPL-MCNC: 107 MG/DL
NUCLEATED RBC (/100WBC) (OHS): 0 /100 WBC
PLATELET # BLD AUTO: 237 K/UL (ref 150–450)
PMV BLD AUTO: 10.3 FL (ref 9.2–12.9)
POTASSIUM SERPL-SCNC: 4.4 MMOL/L (ref 3.5–5.1)
PROT SERPL-MCNC: 7.8 GM/DL (ref 6–8.4)
RBC # BLD AUTO: 4.67 M/UL (ref 4–5.4)
RELATIVE EOSINOPHIL (OHS): 2 %
RELATIVE LYMPHOCYTE (OHS): 23.8 % (ref 18–48)
RELATIVE MONOCYTE (OHS): 3.5 % (ref 4–15)
RELATIVE NEUTROPHIL (OHS): 69.9 % (ref 38–73)
SODIUM SERPL-SCNC: 141 MMOL/L (ref 136–145)
TRIGL SERPL-MCNC: 97 MG/DL (ref 30–150)
WBC # BLD AUTO: 5.42 K/UL (ref 3.9–12.7)

## 2025-07-08 PROCEDURE — 86803 HEPATITIS C AB TEST: CPT

## 2025-07-08 PROCEDURE — 36415 COLL VENOUS BLD VENIPUNCTURE: CPT

## 2025-07-08 PROCEDURE — 80053 COMPREHEN METABOLIC PANEL: CPT

## 2025-07-08 PROCEDURE — 99396 PREV VISIT EST AGE 40-64: CPT | Mod: S$GLB,,, | Performed by: COUNSELOR

## 2025-07-08 PROCEDURE — 3008F BODY MASS INDEX DOCD: CPT | Mod: CPTII,S$GLB,, | Performed by: COUNSELOR

## 2025-07-08 PROCEDURE — 3074F SYST BP LT 130 MM HG: CPT | Mod: CPTII,S$GLB,, | Performed by: COUNSELOR

## 2025-07-08 PROCEDURE — 1159F MED LIST DOCD IN RCRD: CPT | Mod: CPTII,S$GLB,, | Performed by: COUNSELOR

## 2025-07-08 PROCEDURE — 80061 LIPID PANEL: CPT

## 2025-07-08 PROCEDURE — 99999 PR PBB SHADOW E&M-EST. PATIENT-LVL III: CPT | Mod: PBBFAC,,, | Performed by: COUNSELOR

## 2025-07-08 PROCEDURE — 3078F DIAST BP <80 MM HG: CPT | Mod: CPTII,S$GLB,, | Performed by: COUNSELOR

## 2025-07-08 PROCEDURE — 87389 HIV-1 AG W/HIV-1&-2 AB AG IA: CPT

## 2025-07-08 PROCEDURE — 85025 COMPLETE CBC W/AUTO DIFF WBC: CPT

## 2025-07-08 PROCEDURE — 83036 HEMOGLOBIN GLYCOSYLATED A1C: CPT

## 2025-07-08 RX ORDER — CIPROFLOXACIN AND DEXAMETHASONE 3; 1 MG/ML; MG/ML
4 SUSPENSION/ DROPS AURICULAR (OTIC) 2 TIMES DAILY
Qty: 7.5 ML | Refills: 0 | Status: SHIPPED | OUTPATIENT
Start: 2025-07-08

## 2025-07-08 RX ORDER — MAGNESIUM 250 MG
250 TABLET ORAL ONCE
COMMUNITY

## 2025-07-08 NOTE — PROGRESS NOTES
Subjective:       Patient ID: Erika Avina is a 45 y.o. female with history of migraines, PFO, venous insufficiency, premenstrual syndrome.    Chief Complaint: Health maintenance examination [Z00.00]    Patient is new to me, PCP is Dr. Blanka Moore. Here today for the following:    Health maintenance -   Denies family history of colorectal cancer.  Mammogram BI-RADS 1 in 09/24. Due for repeat 1 year.  Denies history of abnormal mammogram.  Denies family history of breast cancer.  Denies family history of ovarian cancer.  Last pap performed 06/24.   Denies history of abnormal pap smear.  Denies family history of osteoporosis.  Family history of cardiac disease.  Due for Tdap,  vaccinations.  Never smoker.  Drinks alcohol 1-2 times weekly, 1-3 drinks per sitting.  Denies drug use.  Due for HIV and hepatitis C screening .  Due for lipid screening.  Due for diabetes screening.    Endorses overall healthy diet.   Eating plenty of fresh vegetables, fruits.  Endorses exercising routinely, 1-2 days weekly. Tennis, cycling, walking dog.    History of Present Illness    CHIEF COMPLAINT:  Patient presents today for annual physical and ear concerns.    EAR CONCERNS:  She reports persistent ear symptoms including itchy, dry ears with one ear feeling swollen and painful. She experiences ear echoing, mild ringing, and occasional discharge from scratching. She has a history of wearing earplugs nightly for many years due to her baby and 's snoring, which she discontinued 8 months ago. She attempted self-treatment with prescription strength hydrocortisone daily for two days, which helped reduce itchiness, and Miracell oil which was ineffective.     MIGRAINES:  Takes sumatriptan for abortive with good effect.  Takes ubrelvy PRN as second line option. Typically uses at end of month when she feels emgality is wearing off.   Has emgality injections for maintenance.   Follows with neuro, now seeing BRAYAN Real.  "Last saw 07/24.    GASTROINTESTINAL:  She experiences mild constipation secondary to Emgality, managed with nightly magnesium supplementation which assists with both sleep and bowel movements. She reports mild straining during bowel movements despite complete evacuation. She previously tried fiber supplements and Colace, but discontinued Colace due to dependency concerns. Her obstetrician recommended GI consultation to evaluate constipation and potential pelvic floor dysfunction related to prior childbirth.    ROS:  General: -fever, -chills, -fatigue, -weight gain, -weight loss  Eyes: -vision changes, -redness, -discharge  ENT: +ear pain, -nasal congestion, -sore throat, +ear pruritus, +tinnitus, +difficulty hearing, +ear discharge  Cardiovascular: -chest pain, -palpitations, -lower extremity edema  Respiratory: -cough, -shortness of breath  Gastrointestinal: -abdominal pain, -nausea, -vomiting, -diarrhea, +constipation, -blood in stool  Genitourinary: -dysuria, -hematuria, -frequency  Musculoskeletal: -joint pain, -muscle pain  Skin: -rash, -lesion  Neurological: -headache, -dizziness, -numbness, -tingling, +migraines  Psychiatric: -anxiety, -depression, -sleep difficulty          Current Outpatient Medications   Medication Instructions    aspirin (ECOTRIN) 81 mg, Oral, Daily    ciprofloxacin-dexAMETHasone 0.3-0.1% (CIPRODEX) 0.3-0.1 % DrpS 4 drops, Both Ears, 2 times daily    diphth,pertus,acell,,tetanus (BOOSTRIX TDAP) 2.5-8-5 Lf-mcg-Lf/0.5mL Syrg injection 0.5 mLs, Intramuscular, Once, For one dose.    EMGALITY  mg, Subcutaneous, Every 28 days    magnesium 250 mg, Once    sumatriptan (IMITREX) 100 MG tablet Take 1 tab PO at onset of migraine, can repeat in 2 hrs if needed.  No more than twice per day or 3 days/wk.    UBRELVY 100 mg, Oral, As needed (PRN), Max 200mg/24hrs     Objective:      Vitals:    07/08/25 0853   BP: 118/62   Pulse: 74   SpO2: 98%   Weight: 56.2 kg (123 lb 14.4 oz)   Height: 5' 4" " (1.626 m)   PainSc: 0-No pain     Body mass index is 21.27 kg/m².    Physical Exam  Constitutional:       General: She is not in acute distress.     Appearance: Normal appearance. She is not ill-appearing.   HENT:      Head: Normocephalic and atraumatic.      Right Ear: Tympanic membrane, ear canal and external ear normal. There is no impacted cerumen.      Left Ear: Tympanic membrane, ear canal and external ear normal. There is no impacted cerumen.      Ears:      Comments: Ear canal had some dry skin without significant debris or discharge noted.      Nose: Nose normal. No congestion or rhinorrhea.      Mouth/Throat:      Mouth: Mucous membranes are moist.      Pharynx: Oropharynx is clear. No oropharyngeal exudate or posterior oropharyngeal erythema.   Eyes:      General: No scleral icterus.        Right eye: No discharge.         Left eye: No discharge.      Extraocular Movements: Extraocular movements intact.      Conjunctiva/sclera: Conjunctivae normal.   Cardiovascular:      Rate and Rhythm: Normal rate and regular rhythm.      Pulses: Normal pulses.      Heart sounds: Normal heart sounds. No murmur heard.     No friction rub. No gallop.   Pulmonary:      Effort: Pulmonary effort is normal. No respiratory distress.      Breath sounds: Normal breath sounds. No stridor. No wheezing, rhonchi or rales.   Chest:      Chest wall: No tenderness.   Abdominal:      General: Abdomen is flat. Bowel sounds are normal. There is no distension.      Palpations: Abdomen is soft. There is no mass.      Tenderness: There is no abdominal tenderness. There is no guarding or rebound.      Hernia: No hernia is present.   Musculoskeletal:      Cervical back: Normal range of motion.      Right lower leg: No edema.      Left lower leg: No edema.   Skin:     General: Skin is warm and dry.      Capillary Refill: Capillary refill takes less than 2 seconds.   Neurological:      General: No focal deficit present.      Mental Status: She  is alert and oriented to person, place, and time. Mental status is at baseline.      Gait: Gait normal.   Psychiatric:         Mood and Affect: Mood normal.         Behavior: Behavior normal.         Thought Content: Thought content normal.         Judgment: Judgment normal.         Assessment:       1. Health maintenance examination    2. Screening for diabetes mellitus    3. Screening for HIV without presence of risk factors    4. Migraine without aura and without status migrainosus, not intractable    5. Screening for cardiovascular condition    6. Encounter for hepatitis C screening test for low risk patient    7. Constipation, unspecified constipation type    8. Chronic eczematous otitis externa of both ears    9. Chronic migraine without aura without status migrainosus, not intractable        IMPRESSION:  - Assessed ear complaints, suspecting possible fungal infection or eczema due to prolonged earplug use.  - Initiated treatment with antibiotic/steroid ear drops to address potential bacterial infection and inflammation.  - Considered antifungal treatment as next step if initial treatment ineffective.  - Evaluated constipation concerns, determining pelvic floor therapy as appropriate initial intervention but referred to CRS per patient preference.     Plan:     PLAN SUMMARY:  - Prescribed CiproDEX drops for both ears, 4 drops twice daily for 7 days  - Ordered Tdap (tetanus) vaccine  - Ordered comprehensive labs including lipid panel, HIV, and Hepatitis C screening  - Continue magnesium supplements for constipation management  - Referred to Dr. Villalta in colorectal department for further evaluation  - Recommend pelvic floor physical therapy for evaluation and management  - Patient instructed to avoid ear manipulation and minimize water exposure during treatment  - Contact office if ear symptoms persist after completing 7-day course of drops  - Follow up on lab results    Health maintenance examination  -      Hemoglobin A1C; Future; Expected date: 07/08/2025  -     HIV 1/2 Ag/Ab (4th Gen); Future; Expected date: 07/08/2025  -     CBC Auto Differential; Future; Expected date: 07/08/2025  -     Lipid Panel; Future; Expected date: 07/08/2025  -     Comprehensive Metabolic Panel; Future; Expected date: 07/08/2025  -     Hepatitis C Antibody; Future; Expected date: 07/08/2025    Screening for diabetes mellitus  -     Hemoglobin A1C; Future; Expected date: 07/08/2025    Screening for HIV without presence of risk factors  -     HIV 1/2 Ag/Ab (4th Gen); Future; Expected date: 07/08/2025    Migraine without aura and without status migrainosus, not intractable  -     CBC Auto Differential; Future; Expected date: 07/08/2025  -     Comprehensive Metabolic Panel; Future; Expected date: 07/08/2025    Screening for cardiovascular condition  -     Lipid Panel; Future; Expected date: 07/08/2025    Encounter for hepatitis C screening test for low risk patient  -     Hepatitis C Antibody; Future; Expected date: 07/08/2025    Constipation, unspecified constipation type  - Patient experiences constipation as side effect of Emgality, currently managed with magnesium supplements.  - Reports straining during bowel movements despite supplementation, though not severely constipated.  - Referred to Dr. Villalta in colorectal department for further evaluation.  - Will continue magnesium supplements in the interim.  - Patient has pelvic floor dysfunction related to history of vaginal delivery, which contributes to constipation and straining during bowel movements.  - Plan to initiate pelvic floor physical therapy for evaluation and management of these issues.  -     Ambulatory referral/consult to Colorectal Surgery; Future; Expected date: 07/15/2025    Chronic eczematous otitis externa of both ears  - Patient reports similar symptoms in ears - itchiness, dryness, swelling, and echoing sensation.  - Examination shows dry and tender ear without clear signs  of fungal infection.  - Try ciprodex for 7 days and reach out to office if ineffective in reducing symptoms.   - Recommend avoiding manipulating ear canal.   - Consider antifungal treatment if unresolved and ENT referral if persistent.   -     ciprofloxacin-dexAMETHasone 0.3-0.1% (CIPRODEX) 0.3-0.1 % DrpS; Place 4 drops into both ears 2 (two) times daily.  Dispense: 7.5 mL; Refill: 0    Chronic migraine without aura without status migrainosus, not intractable  - Migraines are well controlled with current regimen of Emgality and Ubrelvy as second-line treatment.  - Patient experiences breakthrough migraines when Emgality wears off at month's end, but manages these episodes effectively with Ubrelvy.  - Will continue current medication regimen.    FOLLOW-UP AND PREVENTIVE CARE:  - Ordered comprehensive labs including lipid panel, HIV, and Hepatitis C screening.  - Will follow up on results.  - Tdap (tetanus) vaccine ordered.  - For ear treatment, patient instructed to avoid ear manipulation (including Q-tips) during treatment period, minimize water exposure during showering, and contact office if symptoms persist after completing 7-day course of ear drops.         This note was generated with the assistance of ambient listening technology. Verbal consent was obtained by the patient and accompanying visitor(s) for the recording of patient appointment to facilitate this note. I attest to having reviewed and edited the generated note for accuracy, though some syntax or spelling errors may persist. Please contact the author of this note for any clarification.    Timothy Mitchell PA-C    7/8/2025

## 2025-07-15 NOTE — PROGRESS NOTES
"  Established Patient   SUBJECTIVE:  Patient ID: Erika Avina   Chief Complaint: Follow-up and Medication Problem    History of Present Illness:  Erika Avina is a 45 y.o. female who presents to clinic alone for follow-up of headaches.       7/16/2025 - Interval History:  Happy with current regimen but open to adjusting preventative  - Emgality as prevention and ubrelvy or sumatriptan/naproxen for rescue. She has noticed that emgality is less effective. The week before her injection is due she has multiple migraines. She is also getting migraines again after drinking alcohol; something that wasn't happening for awhile. She has two vacations coming up and worried that changing medications could worsen her situation so she would like to wait a few months before making changes. She did start taking a magnesium supplement for constipation and insomnia.    Recommendations made at last Office Visit on 7/1/202:  Discussed symptoms appear to be consistent with chronic migraines, discussed treatment options and patient agreed with the following plan:  - continue emgality for migraine prevention. Medically necessary as patient has tried and failed several other medication options (see above).   - continue Treximet for rescue medication.  - continue ubrelvy as secondary rescue option. Medically necessary as patient has tried and failed multiple other triptans.    - alternative treatment options offered   - importance of healthy diet, regular exercise and sleep hygiene in the treatment of headaches    - Start tracking headaches via Migraine Timothy norberto on phone   - RTC in 1 year.        History of Present Illness:   44 y.o. female with chronic migraines, PFO, who presents to clinic alone for evaluation of headaches.     Patient was previously established with Dr. Mcguire and transferring care to me today for chronic migraines. Last regimen included: emgality, sumatriptan-naproxen "treximet", or ubrelvy prn   " "  Patient has a long history of migraines and reports "emgality has truly changed my life". Since starting emgality, her migraines frequency went from 20/30 to 3/30 days per month. Her headache severity went from 8/10 to 1/10. She uses treximent as her primary rescue and ubrelvy as a second line rescue option. She notices a wearing off effect of emgality on week 3 and tends to use ubrelvy at this time. She is happy with current medication regimen. Still menstruating.      PMHx negative for TBI, Meningitis, Aneurysms, Kidney Stones, asthma, GI bleed, osteoporosis, CAD/MI, CVA/TIA, DM, cancer, pregnancy      Family Hx positive for Migraines in grandmother      Headache History:  Onset - 3rd grade  Location/Radiation - bilateral temporalis, frontalis, occipitalis   Quality - throbbing, achy, stabbing   Duration - 12 hours -3 days  Intensity (range) - 1/10 (prior to Emgality 8/10)  Frequency - 3/30 ha days per month (20/30 prior to emgality), 0/30 are debilitating  Triggers - menstrual cycle, stress, alcohol  Aggravating Factors - movement, light, sound  Alleviating Factors - sleep, dark room, quiet  Recent Changes - no  Prodrome/Aura - no  HA today? - no  Time of day of most headaches- anytime   Sleep - no snoring, wakes feeling refreshed, resolution of headache with sleep     Associated symptoms with the headache:   Meningeal symptoms - photophobia, phonophobia, exercise intolerance   Nausea/vomitting  Nasal drainage   Visual blurriness   Pallor/flushing  Dizziness   Vertigo  Confusion  Impaired concentration   Pain worsened with physical activity   Neck pain            Treatments Tried   Sumtriptan-naproxen (Treximet)*  Eletriptan   Rizatriptan   Ubrelvy*  Topiramate - didn't work   Amitriptylline   Atenolol   Neviolol   Emgality*  Aimovig   Trazadone 50mg*  Lunesta  Magnesium oxide 500mg - c/o reflux and diarrhea    Current Medications:  Current Medications[1]    Review of Systems - as per HPI, otherwise a balanced " "10 systems review is negative.    OBJECTIVE:  Vitals:  /64   Pulse 77   Ht 5' 4" (1.626 m)   Wt 54.4 kg (120 lb)   LMP 06/24/2025 (Exact Date)   BMI 20.60 kg/m²      Physical Exam:  Constitutional: she appears well-developed and well-nourished. she is well groomed. NAD   HENT:    Head: Normocephalic and atraumatic  Eyes: Conjunctivae and EOM are normal  Musculoskeletal: Normal range of motion. No joint stiffness.   Skin: Skin is warm and dry.  Psychiatric: Mood and affect are normal    Neuro: Patient is alert and oriented to person, place, and time. Language is intact and fluent. Speech is clear and fluent. Recent and remote memory are intact.  Normal attention and concentration.  Facial movement is symmetric. Moves all 4 extremities against gravity. Gait and station normal.  Cranial Nerves II through XII without focal deficit.     Review of Data:   Notes from pcp reviewed   Labs:  Lab Visit on 07/08/2025   Component Date Value Ref Range Status    Hemoglobin A1c 07/08/2025 5.3  4.0 - 5.6 % Final    Estimated Average Glucose 07/08/2025 105  68 - 131 mg/dL Final    HIV 1/2 Ag/Ab 07/08/2025 Negative  Negative Final    Cholesterol Total 07/08/2025 180  120 - 199 mg/dL Final    Triglyceride 07/08/2025 97  30 - 150 mg/dL Final    HDL Cholesterol 07/08/2025 73  40 - 75 mg/dL Final    LDL Cholesterol 07/08/2025 87.6  63.0 - 159.0 mg/dL Final    HDL/Cholesterol Ratio 07/08/2025 40.6  20.0 - 50.0 % Final    Cholesterol/HDL Ratio 07/08/2025 2.5  2.0 - 5.0 Final    Non HDL Cholesterol 07/08/2025 107  mg/dL Final    Sodium 07/08/2025 141  136 - 145 mmol/L Final    Potassium 07/08/2025 4.4  3.5 - 5.1 mmol/L Final    Chloride 07/08/2025 103  95 - 110 mmol/L Final    CO2 07/08/2025 26  23 - 29 mmol/L Final    Glucose 07/08/2025 64 (L)  70 - 110 mg/dL Final    BUN 07/08/2025 14  6 - 20 mg/dL Final    Creatinine 07/08/2025 0.8  0.5 - 1.4 mg/dL Final    Calcium 07/08/2025 9.7  8.7 - 10.5 mg/dL Final    Protein Total " 07/08/2025 7.8  6.0 - 8.4 gm/dL Final    Albumin 07/08/2025 4.3  3.5 - 5.2 g/dL Final    Bilirubin Total 07/08/2025 0.5  0.1 - 1.0 mg/dL Final    ALP 07/08/2025 54  40 - 150 unit/L Final    AST 07/08/2025 21  11 - 45 unit/L Final    ALT 07/08/2025 17  10 - 44 unit/L Final    Anion Gap 07/08/2025 12  8 - 16 mmol/L Final    eGFR 07/08/2025 >60  >60 mL/min/1.73/m2 Final    Hep C Ab Interp 07/08/2025 Negative  Negative Final    WBC 07/08/2025 5.42  3.90 - 12.70 K/uL Final    RBC 07/08/2025 4.67  4.00 - 5.40 M/uL Final    HGB 07/08/2025 14.4  12.0 - 16.0 gm/dL Final    HCT 07/08/2025 43.7  37.0 - 48.5 % Final    MCV 07/08/2025 94  82 - 98 fL Final    MCH 07/08/2025 30.8  27.0 - 31.0 pg Final    MCHC 07/08/2025 33.0  32.0 - 36.0 g/dL Final    RDW 07/08/2025 12.8  11.5 - 14.5 % Final    Platelet Count 07/08/2025 237  150 - 450 K/uL Final    MPV 07/08/2025 10.3  9.2 - 12.9 fL Final    Nucleated RBC 07/08/2025 0  <=0 /100 WBC Final    Neut % 07/08/2025 69.9  38 - 73 % Final    Lymph % 07/08/2025 23.8  18 - 48 % Final    Mono % 07/08/2025 3.5 (L)  4 - 15 % Final    Eos % 07/08/2025 2.0  <=8 % Final    Basophil % 07/08/2025 0.6  <=1.9 % Final    Imm Grans % 07/08/2025 0.2  0.0 - 0.5 % Final    Neut # 07/08/2025 3.79  1.8 - 7.7 K/uL Final    Lymph # 07/08/2025 1.29  1 - 4.8 K/uL Final    Mono # 07/08/2025 0.19 (L)  0.3 - 1 K/uL Final    Eos # 07/08/2025 0.11  <=0.5 K/uL Final    Baso # 07/08/2025 0.03  <=0.2 K/uL Final    Imm Grans # 07/08/2025 0.01  0.00 - 0.04 K/uL Final     Imaging:  No results found for this or any previous visit.  Note: I have independently reviewed any/all imaging/labs/tests and agree with the report (s) as documented.  Any discrepancies will be as noted/demarcated by free text.  SHERRIE, FNP-C 7/21/2025    ASSESSMENT:  1. Migraine without aura and without status migrainosus, not intractable    2. Constipation, unspecified constipation type        PLAN:  - preventative: continue emgality for now - will  monitor migraines for the next few months and message NP when she gets back from vacations. If migraines still increase, we will stop emgality and tral ajovy. Will avoid aimovig d/t hx of constipation.   - rescue: continue treximet or ubrelvy prn. Medically necessary as patient has tried and failed several other medications (see full list above).   - Continue tracking headaches   - Discussed goals of therapy are to decrease the frequency, intensity, and duration of headaches  - RTC in 1 year      Orders Placed This Encounter    ubrogepant (UBRELVY) 100 mg tablet    SUMAtriptan-naproxen (TREXIMET)  mg Tab    naproxen (NAPROSYN) 500 MG tablet    galcanezumab-gnlm 120 mg/mL PnIj       Discussed potential for teratogenicity with treatment, patient understands if her family planning status should change she will contact office immediately and we will safely adjust medications as needed.     Questions and concerns were sought and answered to the patient's stated verbal satisfaction.  The patient verbalizes understanding and agreement with the above stated treatment plan.     CC: Blanka Moore MD Monique Smith, FNP-C  [facility]   [phone]    Dr. Correa was available during today's encounter.          [1]   Current Outpatient Medications:     ciprofloxacin-dexAMETHasone 0.3-0.1% (CIPRODEX) 0.3-0.1 % DrpS, Place 4 drops into both ears 2 (two) times daily., Disp: 7.5 mL, Rfl: 0    magnesium 250 mg Tab, Take 250 mg by mouth once., Disp: , Rfl:     aspirin (ECOTRIN) 81 MG EC tablet, Take 1 tablet (81 mg total) by mouth once daily., Disp: , Rfl: 0    galcanezumab-gnlm 120 mg/mL PnIj, Inject 1 mL (120 mg total) into the skin every 28 days. Begin 28 days after loading dose., Disp: 1 mL, Rfl: 2    naproxen (NAPROSYN) 500 MG tablet, Take 1 tablet (500 mg total) by mouth 2 (two) times daily., Disp: 30 tablet, Rfl: 10    sumatriptan (IMITREX) 100 MG tablet, Take 1 tab PO at onset of migraine,  can repeat in 2 hrs if needed.  No more than twice per day or 3 days/wk., Disp: 18 tablet, Rfl: 2    SUMAtriptan-naproxen (TREXIMET)  mg Tab, Take 1 tablet by mouth 2 (two) times daily as needed (migraine)., Disp: 15 tablet, Rfl: 10    ubrogepant (UBRELVY) 100 mg tablet, Take 1 tablet (100 mg total) by mouth as needed for Migraine (90 day supply). Max 200mg/24hrs, Disp: 30 tablet, Rfl: 11

## 2025-07-16 ENCOUNTER — OFFICE VISIT (OUTPATIENT)
Facility: CLINIC | Age: 45
End: 2025-07-16
Payer: COMMERCIAL

## 2025-07-16 VITALS
HEIGHT: 64 IN | WEIGHT: 120 LBS | HEART RATE: 77 BPM | DIASTOLIC BLOOD PRESSURE: 64 MMHG | SYSTOLIC BLOOD PRESSURE: 102 MMHG | BODY MASS INDEX: 20.49 KG/M2

## 2025-07-16 DIAGNOSIS — G43.009 MIGRAINE WITHOUT AURA AND WITHOUT STATUS MIGRAINOSUS, NOT INTRACTABLE: Primary | ICD-10-CM

## 2025-07-16 DIAGNOSIS — K59.00 CONSTIPATION, UNSPECIFIED CONSTIPATION TYPE: ICD-10-CM

## 2025-07-16 PROCEDURE — 99999 PR PBB SHADOW E&M-EST. PATIENT-LVL III: CPT | Mod: PBBFAC,,,

## 2025-07-16 PROCEDURE — 1159F MED LIST DOCD IN RCRD: CPT | Mod: CPTII,S$GLB,,

## 2025-07-16 PROCEDURE — 3044F HG A1C LEVEL LT 7.0%: CPT | Mod: CPTII,S$GLB,,

## 2025-07-16 PROCEDURE — 3074F SYST BP LT 130 MM HG: CPT | Mod: CPTII,S$GLB,,

## 2025-07-16 PROCEDURE — 99214 OFFICE O/P EST MOD 30 MIN: CPT | Mod: S$GLB,,,

## 2025-07-16 PROCEDURE — 3078F DIAST BP <80 MM HG: CPT | Mod: CPTII,S$GLB,,

## 2025-07-16 PROCEDURE — 3008F BODY MASS INDEX DOCD: CPT | Mod: CPTII,S$GLB,,

## 2025-07-16 RX ORDER — FREMANEZUMAB-VFRM 225 MG/1.5ML
225 INJECTION SUBCUTANEOUS
Qty: 1 EACH | Refills: 5 | Status: SHIPPED | OUTPATIENT
Start: 2025-07-16 | End: 2025-07-16

## 2025-07-16 RX ORDER — SUMATRIPTAN AND NAPROXEN SODIUM 85; 500 MG/1; MG/1
1 TABLET, FILM COATED ORAL 2 TIMES DAILY PRN
Qty: 15 TABLET | Refills: 10 | Status: SHIPPED | OUTPATIENT
Start: 2025-07-16

## 2025-07-16 RX ORDER — SUMATRIPTAN AND NAPROXEN SODIUM 85; 500 MG/1; MG/1
TABLET, FILM COATED ORAL
COMMUNITY
Start: 2025-05-30 | End: 2025-07-16 | Stop reason: SDUPTHER

## 2025-07-16 RX ORDER — NAPROXEN 500 MG/1
500 TABLET ORAL 2 TIMES DAILY
Qty: 30 TABLET | Refills: 10 | Status: SHIPPED | OUTPATIENT
Start: 2025-07-16

## 2025-09-03 ENCOUNTER — PATIENT MESSAGE (OUTPATIENT)
Facility: CLINIC | Age: 45
End: 2025-09-03
Payer: COMMERCIAL

## 2025-09-03 DIAGNOSIS — G43.009 MIGRAINE WITHOUT AURA AND WITHOUT STATUS MIGRAINOSUS, NOT INTRACTABLE: Primary | ICD-10-CM

## 2025-09-05 RX ORDER — FREMANEZUMAB-VFRM 225 MG/1.5ML
225 INJECTION SUBCUTANEOUS
Qty: 1 EACH | Refills: 5 | Status: SHIPPED | OUTPATIENT
Start: 2025-09-05